# Patient Record
Sex: FEMALE | Race: WHITE | Employment: FULL TIME | URBAN - METROPOLITAN AREA
[De-identification: names, ages, dates, MRNs, and addresses within clinical notes are randomized per-mention and may not be internally consistent; named-entity substitution may affect disease eponyms.]

---

## 2017-03-13 ENCOUNTER — HOSPITAL ENCOUNTER (OUTPATIENT)
Dept: PHYSICAL THERAPY | Age: 22
Discharge: HOME | End: 2017-03-31
Attending: ORTHOPAEDIC SURGERY | Admitting: ORTHOPAEDIC SURGERY

## 2017-03-15 ENCOUNTER — HOSPITAL ENCOUNTER (OUTPATIENT)
Dept: PHYSICAL THERAPY | Age: 22
Discharge: HOME | End: 2017-03-15
Admitting: ORTHOPAEDIC SURGERY

## 2017-03-20 ENCOUNTER — HOSPITAL ENCOUNTER (OUTPATIENT)
Dept: PHYSICAL THERAPY | Age: 22
Discharge: HOME | End: 2017-03-20
Admitting: ORTHOPAEDIC SURGERY

## 2017-03-30 ENCOUNTER — HOSPITAL ENCOUNTER (OUTPATIENT)
Dept: PHYSICAL THERAPY | Age: 22
Discharge: HOME | End: 2017-03-30
Admitting: ORTHOPAEDIC SURGERY

## 2017-03-31 ENCOUNTER — HOSPITAL ENCOUNTER (OUTPATIENT)
Dept: PHYSICAL THERAPY | Age: 22
Discharge: HOME | End: 2017-03-31
Admitting: ORTHOPAEDIC SURGERY

## 2017-04-03 ENCOUNTER — HOSPITAL ENCOUNTER (OUTPATIENT)
Dept: PHYSICAL THERAPY | Age: 22
Discharge: HOME | End: 2017-04-03
Admitting: ORTHOPAEDIC SURGERY

## 2017-12-11 ENCOUNTER — OFFICE VISIT (OUTPATIENT)
Dept: INTERNAL MEDICINE CLINIC | Age: 22
End: 2017-12-11

## 2017-12-11 VITALS
RESPIRATION RATE: 14 BRPM | BODY MASS INDEX: 27.6 KG/M2 | SYSTOLIC BLOOD PRESSURE: 110 MMHG | HEART RATE: 80 BPM | DIASTOLIC BLOOD PRESSURE: 80 MMHG | WEIGHT: 150 LBS | HEIGHT: 62 IN

## 2017-12-11 DIAGNOSIS — G43.009 MIGRAINE WITHOUT AURA AND WITHOUT STATUS MIGRAINOSUS, NOT INTRACTABLE: Primary | ICD-10-CM

## 2017-12-11 DIAGNOSIS — G93.5 ARNOLD-CHIARI MALFORMATION, TYPE I (HCC): ICD-10-CM

## 2017-12-11 DIAGNOSIS — F41.9 ANXIETY: ICD-10-CM

## 2017-12-11 PROBLEM — Q07.01 ARNOLD-CHIARI MALFORMATION, TYPE II (HCC): Status: RESOLVED | Noted: 2017-12-11 | Resolved: 2017-12-11

## 2017-12-11 PROCEDURE — 99204 OFFICE O/P NEW MOD 45 MIN: CPT | Performed by: INTERNAL MEDICINE

## 2017-12-11 RX ORDER — SUMATRIPTAN 100 MG/1
TABLET, FILM COATED ORAL
COMMUNITY
Start: 2017-10-12 | End: 2019-09-16 | Stop reason: SDUPTHER

## 2017-12-11 RX ORDER — ESCITALOPRAM OXALATE 10 MG/1
TABLET ORAL
COMMUNITY
End: 2018-10-17 | Stop reason: SDUPTHER

## 2017-12-11 ASSESSMENT — ENCOUNTER SYMPTOMS
NAUSEA: 0
SHORTNESS OF BREATH: 0
RHINORRHEA: 0
WHEEZING: 0
VOMITING: 0
ABDOMINAL PAIN: 0

## 2017-12-11 ASSESSMENT — PATIENT HEALTH QUESTIONNAIRE - PHQ9
SUM OF ALL RESPONSES TO PHQ9 QUESTIONS 1 & 2: 0
2. FEELING DOWN, DEPRESSED OR HOPELESS: 0
1. LITTLE INTEREST OR PLEASURE IN DOING THINGS: 0
SUM OF ALL RESPONSES TO PHQ QUESTIONS 1-9: 0

## 2017-12-11 NOTE — PROGRESS NOTES
Subjective:      Patient ID: Doug Valverde is a 23 y.o. female. HPI    Patient is here to establish care. She has a history of migraines. The last several years. It started after she had a head injury. She is seen a neurologist at [facility].  Patient recently presented to the emergency room with severe headaches. She had episodes of vomiting with headache. At that time she was referred back to see her neurologist at [facility]. The neurologist at [facility] referred her to a neurosurgeon. MRI of the brain and cervical spine and thoracic spine and lumbar spine was done. She has a diagnosis of our Charna Rodes Chiari malformation type I made 4 years ago. This is unchanged on recent MRIs. Her HA are doing well. Patient has a history of anxiety. She takes Lexapro for this. Patient is also on birth control pills. She is sexually active. She has a single partner. Review of Systems   Constitutional: Negative for appetite change and fatigue. HENT: Negative for postnasal drip and rhinorrhea. Respiratory: Negative for shortness of breath and wheezing. Cardiovascular: Negative for chest pain and palpitations. Gastrointestinal: Negative for abdominal pain, nausea and vomiting. Musculoskeletal: Negative for joint swelling. Skin: Negative for rash. Neurological: Negative for light-headedness. Psychiatric/Behavioral: Negative for sleep disturbance. History reviewed. No pertinent surgical history.     Family History   Problem Relation Age of Onset    High Blood Pressure Mother     High Blood Pressure Father     Allergy (Severe) Brother     Heart Disease Maternal Grandmother     Heart Disease Maternal Grandfather     Heart Disease Paternal Grandmother     Heart Disease Paternal Grandfather     Diabetes Maternal Cousin        Social History   Substance Use Topics    Smoking status: Never Smoker    Smokeless tobacco: Never Used    Alcohol use No         Current Outpatient Prescriptions:     SUMAtriptan (IMITREX) 100 MG tablet, Take one tablet for a severe headache/migraine. May repeat one tablet in 2 hours if needed one time. Do not use more than 2 tablets in 24 hours. Do not use more than 8 tablets in one month., Disp: , Rfl:     FOLIC ACID PO, Take by mouth, Disp: , Rfl:     escitalopram (LEXAPRO) 10 MG tablet, 10 mg every evening., Disp: , Rfl:     topiramate (TOPAMAX) 25 MG tablet, Take 50 mg by mouth 2 times daily , Disp: , Rfl:     promethazine (PHENERGAN) 25 MG tablet, Take 25 mg by mouth every 6 hours as needed for Nausea, Disp: , Rfl:     levonorgestrel-ethinyl estradiol (ALTAVERA) 0.15-30 MG-MCG per tablet, Take 1 tablet by mouth daily, Disp: , Rfl:     clindamycin-benzoyl peroxide (BENZACLIN) 1-5 % gel, Apply topically 2 times daily Apply topically 2 times daily. , Disp: , Rfl:     ibuprofen (ADVIL;MOTRIN) 200 MG CAPS, Take 2 capsules by mouth.  , Disp: , Rfl:       /80 (Site: Right Arm, Position: Sitting, Cuff Size: Medium Adult)   Pulse 80   Resp 14   Ht 5' 2\" (1.575 m)   Wt 150 lb (68 kg)   BMI 27.44 kg/m²     Objective:   Physical Exam   Constitutional: She is oriented to person, place, and time. She appears well-developed and well-nourished. HENT:   Head: Normocephalic. Eyes: Conjunctivae and EOM are normal. Pupils are equal, round, and reactive to light. Neck: Trachea normal and normal range of motion. Neck supple. No JVD present. Carotid bruit is not present. No thyroid mass and no thyromegaly present. Cardiovascular: Normal rate, regular rhythm and normal heart sounds. Exam reveals no gallop. No murmur heard. Pulmonary/Chest: Effort normal and breath sounds normal. No respiratory distress. She has no wheezes. She has no rales. Abdominal: Soft. Bowel sounds are normal. She exhibits no distension and no mass. There is no splenomegaly or hepatomegaly. There is no tenderness. Musculoskeletal: Normal range of motion.  She exhibits no edema. Lymphadenopathy:     She has no cervical adenopathy. Neurological: She is alert and oriented to person, place, and time. She has normal strength and normal reflexes. No cranial nerve deficit. Skin: Skin is warm and dry. No rash noted. Psychiatric: She has a normal mood and affect. Her behavior is normal. Judgment and thought content normal.       Assessment:      1. Migraine without aura and without status migrainosus, not intractable     2. Arnold-Chiari malformation, type I ([facility]     3. Anxiety             Plan:      Migraines well controlled on Lexapro. Arnold Chiari Malformation type 1: stable. Anxiety on Lexapro.     [facility]

## 2018-01-19 ENCOUNTER — OFFICE VISIT (OUTPATIENT)
Dept: INTERNAL MEDICINE CLINIC | Age: 23
End: 2018-01-19

## 2018-01-19 VITALS
BODY MASS INDEX: 27.6 KG/M2 | SYSTOLIC BLOOD PRESSURE: 100 MMHG | HEART RATE: 80 BPM | RESPIRATION RATE: 14 BRPM | DIASTOLIC BLOOD PRESSURE: 70 MMHG | WEIGHT: 150 LBS | TEMPERATURE: 97.8 F | HEIGHT: 62 IN

## 2018-01-19 DIAGNOSIS — J01.90 ACUTE SINUSITIS, RECURRENCE NOT SPECIFIED, UNSPECIFIED LOCATION: Primary | ICD-10-CM

## 2018-01-19 PROCEDURE — 99212 OFFICE O/P EST SF 10 MIN: CPT | Performed by: NURSE PRACTITIONER

## 2018-01-19 RX ORDER — AZITHROMYCIN 250 MG/1
TABLET, FILM COATED ORAL
Qty: 1 PACKET | Refills: 0 | Status: SHIPPED | OUTPATIENT
Start: 2018-01-19 | End: 2018-01-25

## 2018-01-19 ASSESSMENT — ENCOUNTER SYMPTOMS
NAUSEA: 0
ABDOMINAL PAIN: 0
COUGH: 1
FLU SYMPTOMS: 1
VOMITING: 0
SORE THROAT: 1

## 2018-01-19 NOTE — PROGRESS NOTES
heard.  Pulmonary/Chest: Effort normal and breath sounds normal. No respiratory distress. She has no wheezes. She has no rales. Lymphadenopathy:     She has no cervical adenopathy. Neurological: She is alert and oriented to person, place, and time. Skin: Skin is warm and dry. No rash noted. Psychiatric: She has a normal mood and affect. Her behavior is normal. Judgment and thought content normal.         Assessment and Plan    Acute Sinusitis  - patient not having high fevers. She is afebrile in the office. Symptoms onset was 6 days ago. - Will Rx. Zithromax. Continue Dayquil/Nyquil, Mucinex, saline nasal spray. - rest, fluids. - notify provider if symptoms worsen or do not improve.      John OLIVEIRAP-C  1/19/2018

## 2018-01-25 ENCOUNTER — TELEPHONE (OUTPATIENT)
Dept: INTERNAL MEDICINE CLINIC | Age: 23
End: 2018-01-25

## 2018-01-25 ENCOUNTER — OFFICE VISIT (OUTPATIENT)
Dept: INTERNAL MEDICINE CLINIC | Age: 23
End: 2018-01-25

## 2018-01-25 VITALS
TEMPERATURE: 98.2 F | HEIGHT: 62 IN | HEART RATE: 80 BPM | DIASTOLIC BLOOD PRESSURE: 80 MMHG | SYSTOLIC BLOOD PRESSURE: 115 MMHG | WEIGHT: 150 LBS | BODY MASS INDEX: 27.6 KG/M2

## 2018-01-25 DIAGNOSIS — J20.9 ACUTE BRONCHITIS, UNSPECIFIED ORGANISM: Primary | ICD-10-CM

## 2018-01-25 PROCEDURE — 99213 OFFICE O/P EST LOW 20 MIN: CPT | Performed by: PHYSICIAN ASSISTANT

## 2018-01-25 ASSESSMENT — ENCOUNTER SYMPTOMS
COUGH: 1
SORE THROAT: 0
ABDOMINAL PAIN: 0
WHEEZING: 0
SHORTNESS OF BREATH: 0
VOMITING: 0
NAUSEA: 0
RHINORRHEA: 0

## 2018-01-25 NOTE — PATIENT INSTRUCTIONS
Try mucinex with decongestant during the day     You can take nyquil at night    I would not use any other over the counter meds as many of the same contain the same ingredients    Try warm water with honey   Patient Education        Bronchitis: Care Instructions  Your Care Instructions    Bronchitis is inflammation of the bronchial tubes, which carry air to the lungs. The tubes swell and produce mucus, or phlegm. The mucus and inflamed bronchial tubes make you cough. You may have trouble breathing. Most cases of bronchitis are caused by viruses like those that cause colds. Antibiotics usually do not help and they may be harmful. Bronchitis usually develops rapidly and lasts about 2 to 3 weeks in otherwise healthy people. Follow-up care is a key part of your treatment and safety. Be sure to make and go to all appointments, and call your doctor if you are having problems. It's also a good idea to know your test results and keep a list of the medicines you take. How can you care for yourself at home? · Take all medicines exactly as prescribed. Call your doctor if you think you are having a problem with your medicine. · Get some extra rest.  · Take an over-the-counter pain medicine, such as acetaminophen (Tylenol), ibuprofen (Advil, Motrin), or naproxen (Aleve) to reduce fever and relieve body aches. Read and follow all instructions on the label. · Do not take two or more pain medicines at the same time unless the doctor told you to. Many pain medicines have acetaminophen, which is Tylenol. Too much acetaminophen (Tylenol) can be harmful. · Take an over-the-counter cough medicine that contains dextromethorphan to help quiet a dry, hacking cough so that you can sleep. Avoid cough medicines that have more than one active ingredient. Read and follow all instructions on the label. · Breathe moist air from a humidifier, hot shower, or sink filled with hot water.  The heat and moisture will thin mucus so you can cough it out. · Do not smoke. Smoking can make bronchitis worse. If you need help quitting, talk to your doctor about stop-smoking programs and medicines. These can increase your chances of quitting for good. When should you call for help? Call 911 anytime you think you may need emergency care. For example, call if:  ? · You have severe trouble breathing. ?Call your doctor now or seek immediate medical care if:  ? · You have new or worse trouble breathing. ? · You cough up dark brown or bloody mucus (sputum). ? · You have a new or higher fever. ? · You have a new rash. ? Watch closely for changes in your health, and be sure to contact your doctor if:  ? · You cough more deeply or more often, especially if you notice more mucus or a change in the color of your mucus. ? · You are not getting better as expected. Where can you learn more? Go to [email or web address] and sign in to your [employer] account. Enter H333 in the pickrset box to learn more about \"Bronchitis: Care Instructions. \"     If you do not have an account, please click on the \"Sign Up Now\" link. Current as of: May 12, 2017  Content Version: 11.5  © 5220-9893 [employer]. Care instructions adapted under license by [facility] ([facility]). If you have questions about a medical condition or this instruction, always ask your healthcare professional. [place] [address] any warranty or liability for your use of this information. Patient Education        Cough: Care Instructions  Your Care Instructions    A cough is your body's response to something that bothers your throat or airways. Many things can cause a cough. You might cough because of a cold or the flu, bronchitis, or asthma. Smoking, postnasal drip, allergies, and stomach acid that backs up into your throat also can cause coughs. A cough is a symptom, not a disease. Most coughs stop when the cause, such as a cold, goes away.  You of: May 12, 2017  Content Version: 11.5  © 8756-2490 [employer]. Care instructions adapted under license by [facility] ([facility]). If you have questions about a medical condition or this instruction, always ask your healthcare professional. [address] any warranty or liability for your use of this information.

## 2018-01-25 NOTE — PROGRESS NOTES
Chief Complaint   Patient presents with    Cough        HPI  Werner Pat is a 23 y.o. female who presents for evaluation of  Cough. Was seen last week for URI. She had myalgias, sinus congestion, headaches. She was treated with a zpak for sinusitis. She felt better, then 2 days ago developed a cough. No fever and no chills. Cough is not productive. Still some mild nasal congestion. Mild sore throat. Mom and dad are sick with similar symptoms     She is never smoker, does not live with smokers, no history of asthma    Review of Systems   Constitutional: Negative for appetite change, chills and fever. HENT: Positive for congestion. Negative for postnasal drip, rhinorrhea and sore throat. Respiratory: Positive for cough. Negative for shortness of breath and wheezing. Cardiovascular: Negative for chest pain. Gastrointestinal: Negative for abdominal pain, nausea and vomiting. Musculoskeletal: Negative for neck pain and neck stiffness. Neurological: Positive for headaches. Allergies  Percocet [oxycodone-acetaminophen]; Wellbutrin [bupropion]; and Amoxicillin-pot clavulanate      Vitals  /80   Pulse 80   Temp 98.2 °F (36.8 °C)   Ht 5' 2\" (1.575 m)   Wt 150 lb (68 kg)   BMI 27.44 kg/m²     Current Medications  Current Outpatient Prescriptions   Medication Sig Dispense Refill    SUMAtriptan (IMITREX) 100 MG tablet Take one tablet for a severe headache/migraine. May repeat one tablet in 2 hours if needed one time. Do not use more than 2 tablets in 24 hours. Do not use more than 8 tablets in one month.  escitalopram (LEXAPRO) 10 MG tablet 10 mg every evening.       FOLIC ACID PO Take by mouth      topiramate (TOPAMAX) 25 MG tablet Take 50 mg by mouth 2 times daily       levonorgestrel-ethinyl estradiol (ALTAVERA) 0.15-30 MG-MCG per tablet Take 1 tablet by mouth daily      clindamycin-benzoyl peroxide (BENZACLIN) 1-5 % gel Apply topically 2 times daily Apply topically 2 times daily.  ibuprofen (ADVIL;MOTRIN) 200 MG CAPS Take 2 capsules by mouth. No current facility-administered medications for this visit. Past Medical History  Past Medical History:   Diagnosis Date    Anxiety     Arnold-Chiari malformation, type I ([facility] 12/11/2017    Arnold-Chiari malformation, type II ([facility] 12/11/2017    Chiari I malformation ([facility]     Concussion 11/2013    Depression     Migraine without aura and without status migrainosus, not intractable 12/11/2017       Social History  Social History     Social History    Marital status: Single     Spouse name: N/A    Number of children: N/A    Years of education: N/A     Occupational History    Not on file. Social History Main Topics    Smoking status: Never Smoker    Smokeless tobacco: Never Used    Alcohol use No    Drug use: No    Sexual activity: Yes     Partners: Male     Other Topics Concern    Not on file     Social History Narrative    No narrative on file       Surgical History  No past surgical history on file. Physical Exam   Constitutional: She is oriented to person, place, and time. She appears well-developed and well-nourished. She does not have a sickly appearance. She does not appear ill. HENT:   Head: Normocephalic and atraumatic. Right Ear: Tympanic membrane normal.   Left Ear: Tympanic membrane normal.   Nose: Right sinus exhibits no maxillary sinus tenderness and no frontal sinus tenderness. Left sinus exhibits no maxillary sinus tenderness and no frontal sinus tenderness. Mouth/Throat: Uvula is midline, oropharynx is clear and moist and mucous membranes are normal.   Eyes: Conjunctivae and EOM are normal. Pupils are equal, round, and reactive to light. Right eye exhibits no discharge. Left eye exhibits no discharge. Neck: Trachea normal and normal range of motion. Neck supple. Cardiovascular: Normal rate and regular rhythm.     Pulmonary/Chest: Effort normal and breath sounds normal.

## 2018-01-29 RX ORDER — AZITHROMYCIN 250 MG/1
TABLET, FILM COATED ORAL
Qty: 1 PACKET | Refills: 0 | Status: SHIPPED | OUTPATIENT
Start: 2018-01-29 | End: 2018-11-14 | Stop reason: ALTCHOICE

## 2018-01-29 NOTE — TELEPHONE ENCOUNTER
----- Message from Evy Blanca MD sent at 1/29/2018  3:18 PM EST -----  Contact: [phone]  Alisa Ortiz    ----- Message -----  From: Nneka Winslow  Sent: 1/29/2018   1:35 PM  To: Evy Blanca MD    Called pt-said she really cant come in and afford another co-pay -said she was here twice last wk-she is still requesting something be called in -lr  ----- Message -----  From: Evy Blanca MD  Sent: 1/29/2018   1:31 PM  To:  Zoe Cancer SHY Winslow    I can see her today if she can come in, give her a mask  ----- Message -----  From: Nneka Winslow  Sent: 1/29/2018   8:18 AM  To: Evy Blanca MD    She seen derrick last thurs for bronchitis -was not prescribed any medication-said she got worse over the weekend-starting running a fever of 101 for 2 days-still coughing a lot and generally not feeling any better-said she has been trying mucinex but not helping- could something be called in for her [facility] [place] [facility] [phone]

## 2018-01-31 ENCOUNTER — TELEPHONE (OUTPATIENT)
Dept: INTERNAL MEDICINE CLINIC | Age: 23
End: 2018-01-31

## 2018-01-31 RX ORDER — DOXYCYCLINE HYCLATE 100 MG
100 TABLET ORAL 2 TIMES DAILY
Qty: 14 TABLET | Refills: 0 | Status: SHIPPED | OUTPATIENT
Start: 2018-01-31 | End: 2018-11-14 | Stop reason: ALTCHOICE

## 2018-02-13 ENCOUNTER — TELEPHONE (OUTPATIENT)
Dept: INTERNAL MEDICINE CLINIC | Age: 23
End: 2018-02-13

## 2018-02-13 NOTE — TELEPHONE ENCOUNTER
----- Message from Martín Bautista MD sent at 2/13/2018 11:16 AM EST -----  Contact: pt [phone]  Check dose and ok  ----- Message -----  From: Matias Winslow  Sent: 2/13/2018   9:58 AM  To: Martín Bautista MD    Pt needs refill on Folic Acid, which was previously prescribed by neurologist. She no longer sees the neurologist.   Maya Canales [phone]  Last 1-25  Next 3-13  MT

## 2018-02-14 RX ORDER — FOLIC ACID 1 MG/1
1 TABLET ORAL DAILY
Qty: 30 TABLET | Refills: 0 | Status: SHIPPED | OUTPATIENT
Start: 2018-02-14 | End: 2018-03-14 | Stop reason: SDUPTHER

## 2018-02-14 NOTE — TELEPHONE ENCOUNTER
----- Message from Kylah Gardner MD sent at 2/13/2018 11:16 AM EST -----  Contact: pt [phone]  Check dose and ok  ----- Message -----  From: Fer Winslow  Sent: 2/13/2018   9:58 AM  To: Kylah Gardner MD    Pt needs refill on Folic Acid, which was previously prescribed by neurologist. She no longer sees the neurologist.   Tracy Arrow [phone]  Last 1-25  Next 3-13  MT

## 2018-03-14 RX ORDER — FOLIC ACID 1 MG/1
1 TABLET ORAL DAILY
Qty: 30 TABLET | Refills: 0 | Status: SHIPPED | OUTPATIENT
Start: 2018-03-14 | End: 2018-04-11 | Stop reason: SDUPTHER

## 2018-03-14 RX ORDER — FOLIC ACID 1 MG/1
1 TABLET ORAL DAILY
Qty: 30 TABLET | Refills: 0 | OUTPATIENT
Start: 2018-03-14

## 2018-04-03 ENCOUNTER — OFFICE VISIT (OUTPATIENT)
Dept: INTERNAL MEDICINE CLINIC | Age: 23
End: 2018-04-03

## 2018-04-03 ENCOUNTER — HOSPITAL ENCOUNTER (OUTPATIENT)
Dept: CT IMAGING | Age: 23
Discharge: HOME | End: 2018-04-03
Attending: INTERNAL MEDICINE | Admitting: INTERNAL MEDICINE

## 2018-04-03 ENCOUNTER — TELEPHONE (OUTPATIENT)
Dept: INTERNAL MEDICINE CLINIC | Age: 23
End: 2018-04-03

## 2018-04-03 VITALS
HEIGHT: 61 IN | RESPIRATION RATE: 12 BRPM | TEMPERATURE: 98.4 F | BODY MASS INDEX: 28.89 KG/M2 | DIASTOLIC BLOOD PRESSURE: 78 MMHG | SYSTOLIC BLOOD PRESSURE: 110 MMHG | WEIGHT: 153 LBS

## 2018-04-03 DIAGNOSIS — R10.9 ABDOMINAL PAIN: ICD-10-CM

## 2018-04-03 DIAGNOSIS — R10.9 ABDOMINAL PAIN IN FEMALE PATIENT: Primary | ICD-10-CM

## 2018-04-03 DIAGNOSIS — R10.9 ABDOMINAL PAIN IN FEMALE PATIENT: ICD-10-CM

## 2018-04-03 LAB
BILIRUBIN, POC: NORMAL
BLOOD URINE, POC: NORMAL
CLARITY, POC: NORMAL
COLOR, POC: NORMAL
GLUCOSE URINE, POC: NORMAL
KETONES, POC: NORMAL
LEUKOCYTE EST, POC: NORMAL
NITRITE, POC: NORMAL
PH, POC: NORMAL
PROTEIN, POC: NORMAL
SPECIFIC GRAVITY, POC: NORMAL
UROBILINOGEN, POC: NORMAL

## 2018-04-03 PROCEDURE — 99213 OFFICE O/P EST LOW 20 MIN: CPT | Performed by: PHYSICIAN ASSISTANT

## 2018-04-03 PROCEDURE — 81002 URINALYSIS NONAUTO W/O SCOPE: CPT | Performed by: PHYSICIAN ASSISTANT

## 2018-04-03 ASSESSMENT — ENCOUNTER SYMPTOMS
ABDOMINAL DISTENTION: 0
CHEST TIGHTNESS: 0
EYE DISCHARGE: 0
SHORTNESS OF BREATH: 0
VOMITING: 1
CONSTIPATION: 1
SORE THROAT: 0
ABDOMINAL PAIN: 1
DIARRHEA: 1
WHEEZING: 0
NAUSEA: 1
COUGH: 0
SINUS PRESSURE: 0
BLOOD IN STOOL: 0
RHINORRHEA: 0
EYE ITCHING: 0
BACK PAIN: 0
EYE PAIN: 0

## 2018-04-11 RX ORDER — FOLIC ACID 1 MG/1
1 TABLET ORAL DAILY
Qty: 90 TABLET | Refills: 0 | Status: SHIPPED | OUTPATIENT
Start: 2018-04-11 | End: 2018-07-18 | Stop reason: SDUPTHER

## 2018-07-18 RX ORDER — FOLIC ACID 1 MG/1
1 TABLET ORAL DAILY
Qty: 90 TABLET | Refills: 0 | Status: SHIPPED | OUTPATIENT
Start: 2018-07-18 | End: 2018-10-17 | Stop reason: SDUPTHER

## 2018-10-17 RX ORDER — FOLIC ACID 1 MG/1
1 TABLET ORAL DAILY
Qty: 90 TABLET | Refills: 0 | Status: SHIPPED | OUTPATIENT
Start: 2018-10-17 | End: 2019-01-09 | Stop reason: SDUPTHER

## 2018-10-17 RX ORDER — ESCITALOPRAM OXALATE 10 MG/1
TABLET ORAL
Qty: 90 TABLET | Refills: 0 | Status: SHIPPED | OUTPATIENT
Start: 2018-10-17 | End: 2019-01-03 | Stop reason: SDUPTHER

## 2018-10-17 RX ORDER — TOPIRAMATE 25 MG/1
50 TABLET ORAL 2 TIMES DAILY
Qty: 60 TABLET | Refills: 2 | Status: SHIPPED | OUTPATIENT
Start: 2018-10-17 | End: 2019-01-03 | Stop reason: SDUPTHER

## 2018-11-14 ENCOUNTER — HOSPITAL ENCOUNTER (EMERGENCY)
Age: 23
Discharge: HOME | End: 2018-11-14
Attending: EMERGENCY MEDICINE
Payer: COMMERCIAL

## 2018-11-14 VITALS
TEMPERATURE: 98.2 F | HEART RATE: 68 BPM | SYSTOLIC BLOOD PRESSURE: 114 MMHG | BODY MASS INDEX: 28.34 KG/M2 | WEIGHT: 150 LBS | OXYGEN SATURATION: 99 % | DIASTOLIC BLOOD PRESSURE: 70 MMHG | RESPIRATION RATE: 16 BRPM

## 2018-11-14 DIAGNOSIS — R51.9 ACUTE NONINTRACTABLE HEADACHE, UNSPECIFIED HEADACHE TYPE: Primary | ICD-10-CM

## 2018-11-14 PROCEDURE — 6360000002 HC RX W HCPCS: Performed by: EMERGENCY MEDICINE

## 2018-11-14 PROCEDURE — 2580000003 HC RX 258: Performed by: EMERGENCY MEDICINE

## 2018-11-14 PROCEDURE — 99283 EMERGENCY DEPT VISIT LOW MDM: CPT

## 2018-11-14 PROCEDURE — 96375 TX/PRO/DX INJ NEW DRUG ADDON: CPT

## 2018-11-14 PROCEDURE — 96361 HYDRATE IV INFUSION ADD-ON: CPT

## 2018-11-14 PROCEDURE — 96374 THER/PROPH/DIAG INJ IV PUSH: CPT

## 2018-11-14 RX ORDER — LEVONORGESTREL AND ETHINYL ESTRADIOL 0.15-0.03
1 KIT ORAL DAILY
COMMUNITY
End: 2022-03-15 | Stop reason: ALTCHOICE

## 2018-11-14 RX ORDER — 0.9 % SODIUM CHLORIDE 0.9 %
1000 INTRAVENOUS SOLUTION INTRAVENOUS ONCE
Status: COMPLETED | OUTPATIENT
Start: 2018-11-14 | End: 2018-11-14

## 2018-11-14 RX ORDER — METHYLPREDNISOLONE SODIUM SUCCINATE 125 MG/2ML
125 INJECTION, POWDER, LYOPHILIZED, FOR SOLUTION INTRAMUSCULAR; INTRAVENOUS ONCE
Status: COMPLETED | OUTPATIENT
Start: 2018-11-14 | End: 2018-11-14

## 2018-11-14 RX ADMIN — PROCHLORPERAZINE EDISYLATE 10 MG: 5 INJECTION INTRAMUSCULAR; INTRAVENOUS at 08:18

## 2018-11-14 RX ADMIN — METHYLPREDNISOLONE SODIUM SUCCINATE 125 MG: 125 INJECTION, POWDER, FOR SOLUTION INTRAMUSCULAR; INTRAVENOUS at 09:03

## 2018-11-14 RX ADMIN — SODIUM CHLORIDE 1000 ML: 9 INJECTION, SOLUTION INTRAVENOUS at 08:18

## 2018-11-14 ASSESSMENT — PAIN SCALES - GENERAL: PAINLEVEL_OUTOF10: 9

## 2018-11-14 NOTE — ED PROVIDER NOTES
Maternal Grandfather     Heart Disease Paternal Grandmother     Heart Disease Paternal Grandfather     Diabetes Maternal Cousin      Social History     Social History    Marital status: Single     Spouse name: N/A    Number of children: N/A    Years of education: N/A     Occupational History    Not on file. Social History Main Topics    Smoking status: Never Smoker    Smokeless tobacco: Never Used    Alcohol use No    Drug use: No    Sexual activity: Yes     Partners: Male     Other Topics Concern    Not on file     Social History Narrative    No narrative on file     Current Facility-Administered Medications   Medication Dose Route Frequency Provider Last Rate Last Dose    0.9 % sodium chloride bolus  1,000 mL Intravenous Once Shobha Driver MD 1,000 mL/hr at 11/14/18 0818 1,000 mL at 11/14/18 0818    methylPREDNISolone sodium (SOLU-MEDROL) injection 125 mg  125 mg Intravenous Once Shobha Driver MD         Current Outpatient Prescriptions   Medication Sig Dispense Refill    levonorgestrel-ethinyl estradiol (INTROVALE) 0.15-0.03 MG per tablet Take 1 tablet by mouth daily      topiramate (TOPAMAX) 25 MG tablet Take 2 tablets by mouth 2 times daily 60 tablet 2    folic acid (FOLVITE) 1 MG tablet Take 1 tablet by mouth daily 90 tablet 0    escitalopram (LEXAPRO) 10 MG tablet 10 mg every evening. 90 tablet 0    SUMAtriptan (IMITREX) 100 MG tablet Take one tablet for a severe headache/migraine. May repeat one tablet in 2 hours if needed one time. Do not use more than 2 tablets in 24 hours. Do not use more than 8 tablets in one month.  ibuprofen (ADVIL;MOTRIN) 200 MG CAPS Take 2 capsules by mouth.          Allergies   Allergen Reactions    Percocet [Oxycodone-Acetaminophen] Itching    Wellbutrin [Bupropion] Other (See Comments)     Suspects caused headache    Amoxicillin-Pot Clavulanate Nausea And Vomiting       Nursing Notes Reviewed    Physical Exam:  ED Triage Vitals [11/14/18 0806]

## 2019-01-03 RX ORDER — ESCITALOPRAM OXALATE 10 MG/1
TABLET ORAL
Qty: 90 TABLET | Refills: 0 | Status: SHIPPED | OUTPATIENT
Start: 2019-01-03 | End: 2019-03-05 | Stop reason: SDUPTHER

## 2019-01-09 RX ORDER — FOLIC ACID 1 MG/1
1 TABLET ORAL DAILY
Qty: 90 TABLET | Refills: 0 | Status: SHIPPED | OUTPATIENT
Start: 2019-01-09 | End: 2019-04-01 | Stop reason: SDUPTHER

## 2019-03-05 ENCOUNTER — OFFICE VISIT (OUTPATIENT)
Dept: INTERNAL MEDICINE CLINIC | Age: 24
End: 2019-03-05

## 2019-03-05 VITALS
HEART RATE: 70 BPM | HEIGHT: 62 IN | RESPIRATION RATE: 14 BRPM | WEIGHT: 158 LBS | BODY MASS INDEX: 29.08 KG/M2 | DIASTOLIC BLOOD PRESSURE: 80 MMHG | SYSTOLIC BLOOD PRESSURE: 130 MMHG

## 2019-03-05 DIAGNOSIS — Z00.00 ROUTINE GENERAL MEDICAL EXAMINATION AT A HEALTH CARE FACILITY: Primary | ICD-10-CM

## 2019-03-05 DIAGNOSIS — G93.5 ARNOLD-CHIARI MALFORMATION, TYPE I (HCC): ICD-10-CM

## 2019-03-05 DIAGNOSIS — G43.009 MIGRAINE WITHOUT AURA AND WITHOUT STATUS MIGRAINOSUS, NOT INTRACTABLE: ICD-10-CM

## 2019-03-05 DIAGNOSIS — F41.9 ANXIETY: ICD-10-CM

## 2019-03-05 PROCEDURE — 99395 PREV VISIT EST AGE 18-39: CPT | Performed by: INTERNAL MEDICINE

## 2019-03-05 RX ORDER — DULOXETIN HYDROCHLORIDE 30 MG/1
CAPSULE, DELAYED RELEASE ORAL
Qty: 90 CAPSULE | Refills: 1 | Status: SHIPPED | OUTPATIENT
Start: 2019-03-05 | End: 2019-03-06 | Stop reason: SDUPTHER

## 2019-03-05 RX ORDER — ESCITALOPRAM OXALATE 10 MG/1
TABLET ORAL
Qty: 90 TABLET | Refills: 0 | Status: SHIPPED | OUTPATIENT
Start: 2019-03-05 | End: 2019-09-16

## 2019-03-05 ASSESSMENT — ENCOUNTER SYMPTOMS
WHEEZING: 0
VOMITING: 0
SHORTNESS OF BREATH: 0
NAUSEA: 0
RHINORRHEA: 0
ABDOMINAL PAIN: 0

## 2019-03-06 DIAGNOSIS — Z00.00 ROUTINE GENERAL MEDICAL EXAMINATION AT A HEALTH CARE FACILITY: ICD-10-CM

## 2019-03-06 RX ORDER — DULOXETIN HYDROCHLORIDE 30 MG/1
CAPSULE, DELAYED RELEASE ORAL
Qty: 30 CAPSULE | Refills: 0 | Status: SHIPPED | OUTPATIENT
Start: 2019-03-06 | End: 2019-03-29 | Stop reason: SDUPTHER

## 2019-03-19 RX ORDER — TOPIRAMATE 25 MG/1
TABLET ORAL
Qty: 120 TABLET | Refills: 0 | Status: SHIPPED | OUTPATIENT
Start: 2019-03-19 | End: 2019-04-25 | Stop reason: SDUPTHER

## 2019-03-25 ENCOUNTER — TELEPHONE (OUTPATIENT)
Dept: INTERNAL MEDICINE CLINIC | Age: 24
End: 2019-03-25

## 2019-03-25 RX ORDER — AZITHROMYCIN 250 MG/1
TABLET, FILM COATED ORAL
Qty: 1 PACKET | Refills: 0 | Status: SHIPPED | OUTPATIENT
Start: 2019-03-25 | End: 2019-09-16

## 2019-03-29 DIAGNOSIS — Z00.00 ROUTINE GENERAL MEDICAL EXAMINATION AT A HEALTH CARE FACILITY: ICD-10-CM

## 2019-03-29 RX ORDER — DULOXETIN HYDROCHLORIDE 30 MG/1
CAPSULE, DELAYED RELEASE ORAL
Qty: 30 CAPSULE | Refills: 0 | Status: SHIPPED | OUTPATIENT
Start: 2019-03-29 | End: 2019-04-28 | Stop reason: SDUPTHER

## 2019-04-02 RX ORDER — FOLIC ACID 1 MG/1
TABLET ORAL
Qty: 90 TABLET | Refills: 0 | Status: SHIPPED | OUTPATIENT
Start: 2019-04-02 | End: 2019-08-01 | Stop reason: SDUPTHER

## 2019-04-25 RX ORDER — TOPIRAMATE 25 MG/1
TABLET ORAL
Qty: 120 TABLET | Refills: 1 | Status: SHIPPED | OUTPATIENT
Start: 2019-04-25 | End: 2019-05-22 | Stop reason: SDUPTHER

## 2019-04-25 NOTE — TELEPHONE ENCOUNTER
----- Message from Ankit Winslow sent at 4/25/2019 10:39 AM EDT -----  Contact: pt- [phone]  She needs a [facility]ill on [facility]ax [facility] [place] [facility] [phone]-last ruue-5-1-19-next appt- 6-18-19-lr

## 2019-04-28 DIAGNOSIS — Z00.00 ROUTINE GENERAL MEDICAL EXAMINATION AT A HEALTH CARE FACILITY: ICD-10-CM

## 2019-04-29 RX ORDER — DULOXETIN HYDROCHLORIDE 30 MG/1
CAPSULE, DELAYED RELEASE ORAL
Qty: 30 CAPSULE | Refills: 0 | Status: SHIPPED | OUTPATIENT
Start: 2019-04-29 | End: 2019-05-29 | Stop reason: SDUPTHER

## 2019-05-22 RX ORDER — TOPIRAMATE 25 MG/1
TABLET ORAL
Qty: 120 TABLET | Refills: 0 | Status: SHIPPED | OUTPATIENT
Start: 2019-05-22 | End: 2019-06-05 | Stop reason: SDUPTHER

## 2019-05-29 DIAGNOSIS — Z00.00 ROUTINE GENERAL MEDICAL EXAMINATION AT A HEALTH CARE FACILITY: ICD-10-CM

## 2019-05-29 RX ORDER — DULOXETIN HYDROCHLORIDE 30 MG/1
CAPSULE, DELAYED RELEASE ORAL
Qty: 30 CAPSULE | Refills: 0 | Status: SHIPPED | OUTPATIENT
Start: 2019-05-29 | End: 2019-06-07 | Stop reason: SDUPTHER

## 2019-06-05 RX ORDER — TOPIRAMATE 25 MG/1
TABLET ORAL
Qty: 360 TABLET | Refills: 0 | Status: SHIPPED | OUTPATIENT
Start: 2019-06-05 | End: 2019-09-20 | Stop reason: SDUPTHER

## 2019-06-07 DIAGNOSIS — Z00.00 ROUTINE GENERAL MEDICAL EXAMINATION AT A HEALTH CARE FACILITY: ICD-10-CM

## 2019-06-07 RX ORDER — DULOXETIN HYDROCHLORIDE 30 MG/1
CAPSULE, DELAYED RELEASE ORAL
Qty: 30 CAPSULE | Refills: 2 | Status: SHIPPED | OUTPATIENT
Start: 2019-06-07 | End: 2019-08-31 | Stop reason: SDUPTHER

## 2019-08-01 RX ORDER — FOLIC ACID 1 MG/1
TABLET ORAL
Qty: 90 TABLET | Refills: 0 | Status: SHIPPED | OUTPATIENT
Start: 2019-08-01 | End: 2019-10-25 | Stop reason: SDUPTHER

## 2019-08-31 DIAGNOSIS — Z00.00 ROUTINE GENERAL MEDICAL EXAMINATION AT A HEALTH CARE FACILITY: ICD-10-CM

## 2019-09-03 RX ORDER — DULOXETIN HYDROCHLORIDE 30 MG/1
CAPSULE, DELAYED RELEASE ORAL
Qty: 30 CAPSULE | Refills: 0 | Status: SHIPPED | OUTPATIENT
Start: 2019-09-03 | End: 2019-09-16 | Stop reason: SDUPTHER

## 2019-09-16 ENCOUNTER — OFFICE VISIT (OUTPATIENT)
Dept: INTERNAL MEDICINE CLINIC | Age: 24
End: 2019-09-16

## 2019-09-16 VITALS
BODY MASS INDEX: 28.52 KG/M2 | DIASTOLIC BLOOD PRESSURE: 80 MMHG | SYSTOLIC BLOOD PRESSURE: 110 MMHG | WEIGHT: 155 LBS | HEIGHT: 62 IN | RESPIRATION RATE: 14 BRPM | HEART RATE: 70 BPM

## 2019-09-16 DIAGNOSIS — G93.5 ARNOLD-CHIARI MALFORMATION, TYPE I (HCC): ICD-10-CM

## 2019-09-16 DIAGNOSIS — F41.9 ANXIETY: ICD-10-CM

## 2019-09-16 DIAGNOSIS — G43.009 MIGRAINE WITHOUT AURA AND WITHOUT STATUS MIGRAINOSUS, NOT INTRACTABLE: Primary | ICD-10-CM

## 2019-09-16 PROCEDURE — 86580 TB INTRADERMAL TEST: CPT | Performed by: INTERNAL MEDICINE

## 2019-09-16 PROCEDURE — 99213 OFFICE O/P EST LOW 20 MIN: CPT | Performed by: INTERNAL MEDICINE

## 2019-09-16 RX ORDER — DULOXETIN HYDROCHLORIDE 30 MG/1
30 CAPSULE, DELAYED RELEASE ORAL DAILY
Qty: 30 CAPSULE | Refills: 2 | Status: SHIPPED | OUTPATIENT
Start: 2019-09-16 | End: 2019-10-31 | Stop reason: SDUPTHER

## 2019-09-16 RX ORDER — SUMATRIPTAN 100 MG/1
TABLET, FILM COATED ORAL
Qty: 9 TABLET | Refills: 2 | Status: SHIPPED | OUTPATIENT
Start: 2019-09-16 | End: 2022-03-15 | Stop reason: SDUPTHER

## 2019-09-16 NOTE — PROGRESS NOTES
Effort normal and breath sounds normal. No respiratory distress. She has no wheezes. She has no rales. Abdominal: Soft. Bowel sounds are normal. She exhibits no distension and no mass. There is no splenomegaly or hepatomegaly. There is no tenderness. Musculoskeletal: Normal range of motion. She exhibits no edema. Lymphadenopathy:     She has no cervical adenopathy. Neurological: She is alert and oriented to person, place, and time. She has normal strength and normal reflexes. No cranial nerve deficit. Skin: Skin is warm and dry. No rash noted. Psychiatric: She has a normal mood and affect. Her behavior is normal. Judgment and thought content normal.       Assessment:       Diagnosis Orders   1. Migraine without aura and without status migrainosus, not intractable     2. Arnold-Chiari malformation, type I ([facility]     3. Anxiety            Plan:      #  Migraines controlled. #  Anxiety on Cymbalta. #  Arnold Chiari malformation stable. PPD placed left forearm.         Jaqui Villagomez MD

## 2019-09-20 RX ORDER — TOPIRAMATE 25 MG/1
TABLET ORAL
Qty: 360 TABLET | Refills: 0 | Status: SHIPPED | OUTPATIENT
Start: 2019-09-20 | End: 2020-01-06 | Stop reason: SDUPTHER

## 2019-09-23 ENCOUNTER — NURSE ONLY (OUTPATIENT)
Dept: INTERNAL MEDICINE CLINIC | Age: 24
End: 2019-09-23

## 2019-09-23 DIAGNOSIS — Z23 NEED FOR INFLUENZA VACCINATION: Primary | ICD-10-CM

## 2019-09-23 DIAGNOSIS — Z11.1 VISIT FOR TB SKIN TEST: ICD-10-CM

## 2019-09-23 PROCEDURE — 90682 RIV4 VACC RECOMBINANT DNA IM: CPT | Performed by: INTERNAL MEDICINE

## 2019-09-23 PROCEDURE — 90471 IMMUNIZATION ADMIN: CPT | Performed by: INTERNAL MEDICINE

## 2019-09-23 PROCEDURE — 86580 TB INTRADERMAL TEST: CPT | Performed by: INTERNAL MEDICINE

## 2019-09-25 ENCOUNTER — NURSE ONLY (OUTPATIENT)
Dept: INTERNAL MEDICINE CLINIC | Age: 24
End: 2019-09-25

## 2019-10-25 RX ORDER — FOLIC ACID 1 MG/1
TABLET ORAL
Qty: 90 TABLET | Refills: 0 | Status: SHIPPED | OUTPATIENT
Start: 2019-10-25 | End: 2020-01-29

## 2019-11-01 RX ORDER — DULOXETIN HYDROCHLORIDE 30 MG/1
CAPSULE, DELAYED RELEASE ORAL
Qty: 30 CAPSULE | Refills: 0 | Status: SHIPPED | OUTPATIENT
Start: 2019-11-01 | End: 2019-12-04 | Stop reason: SDUPTHER

## 2019-12-04 RX ORDER — DULOXETIN HYDROCHLORIDE 30 MG/1
CAPSULE, DELAYED RELEASE ORAL
Qty: 30 CAPSULE | Refills: 0 | Status: SHIPPED | OUTPATIENT
Start: 2019-12-04 | End: 2020-01-06 | Stop reason: SDUPTHER

## 2019-12-26 ENCOUNTER — TELEPHONE (OUTPATIENT)
Dept: INTERNAL MEDICINE CLINIC | Age: 24
End: 2019-12-26

## 2019-12-26 RX ORDER — AZITHROMYCIN 250 MG/1
250 TABLET, FILM COATED ORAL SEE ADMIN INSTRUCTIONS
Qty: 6 TABLET | Refills: 0 | Status: SHIPPED | OUTPATIENT
Start: 2019-12-26 | End: 2019-12-31

## 2020-01-06 ENCOUNTER — TELEPHONE (OUTPATIENT)
Dept: INTERNAL MEDICINE CLINIC | Age: 25
End: 2020-01-06

## 2020-01-06 RX ORDER — DULOXETIN HYDROCHLORIDE 30 MG/1
CAPSULE, DELAYED RELEASE ORAL
Qty: 30 CAPSULE | Refills: 1 | Status: SHIPPED | OUTPATIENT
Start: 2020-01-06 | End: 2020-01-29

## 2020-01-06 NOTE — TELEPHONE ENCOUNTER
----- Message from Jonn Bowen sent at 1/6/2020 11:22 AM EST -----  Contact: [phone]  Pt called because she needs a refill on DULoxetine (CYMBALTA) 30 MG     [phone]    Past appt-09/16/2019  Future appt-03/03/2020    Pharmacy[facility] [place]

## 2020-01-07 RX ORDER — TOPIRAMATE 25 MG/1
TABLET ORAL
Qty: 360 TABLET | Refills: 0 | Status: SHIPPED | OUTPATIENT
Start: 2020-01-07 | End: 2020-03-23

## 2020-01-26 ENCOUNTER — APPOINTMENT (OUTPATIENT)
Dept: GENERAL RADIOLOGY | Age: 25
End: 2020-01-26
Payer: COMMERCIAL

## 2020-01-26 ENCOUNTER — HOSPITAL ENCOUNTER (EMERGENCY)
Age: 25
Discharge: HOME | End: 2020-01-26
Attending: EMERGENCY MEDICINE
Payer: COMMERCIAL

## 2020-01-26 VITALS
DIASTOLIC BLOOD PRESSURE: 99 MMHG | OXYGEN SATURATION: 100 % | WEIGHT: 160 LBS | BODY MASS INDEX: 29.44 KG/M2 | SYSTOLIC BLOOD PRESSURE: 141 MMHG | HEIGHT: 62 IN | HEART RATE: 81 BPM | RESPIRATION RATE: 12 BRPM | TEMPERATURE: 99 F

## 2020-01-26 PROCEDURE — 73610 X-RAY EXAM OF ANKLE: CPT

## 2020-01-26 PROCEDURE — 73630 X-RAY EXAM OF FOOT: CPT

## 2020-01-26 PROCEDURE — 99283 EMERGENCY DEPT VISIT LOW MDM: CPT

## 2020-01-26 RX ORDER — IBUPROFEN 600 MG/1
600 TABLET ORAL EVERY 6 HOURS PRN
Qty: 28 TABLET | Refills: 0 | Status: SHIPPED | OUTPATIENT
Start: 2020-01-26 | End: 2020-04-30 | Stop reason: ALTCHOICE

## 2020-01-26 ASSESSMENT — PAIN SCALES - GENERAL: PAINLEVEL_OUTOF10: 8

## 2020-01-26 ASSESSMENT — PAIN DESCRIPTION - LOCATION: LOCATION: ANKLE

## 2020-01-26 ASSESSMENT — PAIN DESCRIPTION - ORIENTATION: ORIENTATION: RIGHT

## 2020-01-27 NOTE — ED PROVIDER NOTES
Emergency Department Attending Note    Victor Manuel Huggins MD    Date of ED VIsit: 1/26/2020    CHIEF COMPLAINT  Ankle Pain (Pt reports falling in hole and afterwards ankle gave out and fell down about two steps and c/o pain, unable to bear weight on it since. )      [address] is a 27 y.o. female  With Vital signs of BP (!) 141/99   Pulse 81   Temp 99 °F (37.2 °C) (Oral)   Resp 12   Ht 5' 2\" (1.575 m)   Wt 160 lb (72.6 kg)   SpO2 100%   BMI 29.26 kg/m²  who presents to the ED with a complaint of right foot and ankle pain. Patient seen and evaluated in room 4. Patient states she had 2 incidences today once she stepped in a hole and that she got when she got home she's fell down a couple stairs causing injury to her right ankle. Sounds like 1 of those injuries was an inversion type injury. She does not have any other complaints including any any other areas of trauma she never hit her head there is no loss of consciousness. She is only focused primarily on her ankle as the source of pain. .  No other complaints, modifying factors or associated symptoms. Patients Past medical history reviewed and listed below  Past Medical History:   Diagnosis Date    Anxiety     Arnold-Chiari malformation, type I ([address] 12/11/2017    Arnold-Chiari malformation, type II ([address] 12/11/2017    Chiari I malformation ([address]     Concussion 11/2013    Depression     Migraine without aura and without status migrainosus, not intractable 12/11/2017     History reviewed. No pertinent surgical history. I have reviewed the following from the nursing documentation.     Family History   Problem Relation Age of Onset    High Blood Pressure Mother     High Blood Pressure Father     Allergy (Severe) Brother     Heart Disease Maternal Grandmother     Heart Disease Maternal Grandfather     Heart Disease Paternal Grandmother     Heart Disease Paternal Grandfather     Diabetes Maternal Cousin      Social needed one time. Do not use more than 2 tablets in 24 hours. Do not use more than 8 tablets in one month. 9 tablet 2    levonorgestrel-ethinyl estradiol (INTROVALE) 0.15-0.03 MG per tablet Take 1 tablet by mouth daily       Allergies   Allergen Reactions    Percocet [Oxycodone-Acetaminophen] Itching    Wellbutrin [Bupropion] Other (See Comments)     Suspects caused headache    Amoxicillin-Pot Clavulanate Nausea And Vomiting       REVIEW OF SYSTEMS  10 systems reviewed, pertinent positives per HPI otherwise noted to be negative     PHYSICAL EXAM  BP (!) 141/99   Pulse 81   Temp 99 °F (37.2 °C) (Oral)   Resp 12   Ht 5' 2\" (1.575 m)   Wt 160 lb (72.6 kg)   SpO2 100%   BMI 29.26 kg/m²   GENERAL APPEARANCE: Awake and alert. Cooperative. In no obvious distress. HEAD: Normocephalic. Atraumatic. EYES: PERRL. EOM's grossly intact. ENT: Mucous membranes are pink and moist.   NECK: Supple. HEART: RRR. No murmurs. LUNGS: Respirations unlabored. CTAB. Good air exchange. ABDOMEN: Soft. Non-distended. Non-tender. No masses. No organomegaly. No guarding or rebound. EXTREMITIES: No peripheral edema. Moves all extremities equally. All extremities neurovascularly intact. There is a very small amount of swelling on the lateral aspect of the ankle inferior to the malleolus  SKIN: Warm and dry. No acute rashes. NEUROLOGICAL: Alert and oriented. Strength 5/5, sensation intact. Gait normal.  Is able to wiggle her toes  PSYCHIATRIC: Normal mood and affect. No HI or SI expressed to me. RADIOLOGY    If acquired see below     EKG:     If acquired see below       ED COURSE/MDM      Radiographs patient has no fracture so it lists this point I think she has a an ankle sprain she will be placed in an Aircast and sent on her way plus minus crutches    ED Course as of Jan 26 2305   Ashwin Gee Jan 26, 2020 2305 FINDINGS:  No evidence of acute fracture or dislocation. Normal alignment of the ankle  mortise.   No focal osseous

## 2020-01-27 NOTE — ED NOTES
Patient provided with discharge instructions and any prescriptions. Air Cast applied to affected ankle. Follow-up reviewed with patient/family. No further questions verbalized at this time. Vital signs and patient stable upon discharge.         Naveed Arechiga RN  01/26/20 5093

## 2020-01-29 RX ORDER — FOLIC ACID 1 MG/1
TABLET ORAL
Qty: 90 TABLET | Refills: 0 | Status: SHIPPED | OUTPATIENT
Start: 2020-01-29 | End: 2020-04-30

## 2020-01-29 RX ORDER — DULOXETIN HYDROCHLORIDE 30 MG/1
CAPSULE, DELAYED RELEASE ORAL
Qty: 30 CAPSULE | Refills: 1 | Status: SHIPPED | OUTPATIENT
Start: 2020-01-29 | End: 2020-03-09 | Stop reason: SDUPTHER

## 2020-03-04 NOTE — PROGRESS NOTES
injury. Range of motion is unremarkable. There is no gross instability. There are no rashes, ulcerations or lesions. Strength and tone are normal.    Radiology:     X-rays obtained and reviewed in office:  Views 3  Location left foot  Impression shows no evidence of osseous lesion she is skeletally mature          Assessment : Patient has left foot plantar fasciitis    Impression:  Encounter Diagnosis   Name Primary?  Foot pain, left Yes       Office Procedures:  No orders of the defined types were placed in this encounter. Treatment Plan:  The etiology of plantar fasciitis and it's appropriate treatment were discussed in great detail. Wrote out her plan of care and copied to the media section of her chart. I gave her meloxicam 15 mg p.o. daily and discussed the side effects I will get her to physical therapy and see her back in 2 weeks.   If she is not improving I would inject her and put her in a boot put her on prednisone keep her off of her feet over her spring break

## 2020-03-05 ENCOUNTER — OFFICE VISIT (OUTPATIENT)
Dept: ORTHOPEDIC SURGERY | Age: 25
End: 2020-03-05
Payer: COMMERCIAL

## 2020-03-05 VITALS — WEIGHT: 160.05 LBS | BODY MASS INDEX: 29.45 KG/M2 | HEIGHT: 62 IN

## 2020-03-05 PROCEDURE — 99203 OFFICE O/P NEW LOW 30 MIN: CPT | Performed by: ORTHOPAEDIC SURGERY

## 2020-03-05 RX ORDER — MELOXICAM 15 MG/1
15 TABLET ORAL DAILY
Qty: 30 TABLET | Refills: 0 | Status: SHIPPED | OUTPATIENT
Start: 2020-03-05 | End: 2021-04-22

## 2020-03-09 RX ORDER — DULOXETIN HYDROCHLORIDE 30 MG/1
CAPSULE, DELAYED RELEASE ORAL
Qty: 30 CAPSULE | Refills: 1 | Status: SHIPPED | OUTPATIENT
Start: 2020-03-09 | End: 2020-04-01

## 2020-03-23 RX ORDER — TOPIRAMATE 25 MG/1
TABLET ORAL
Qty: 360 TABLET | Refills: 0 | Status: SHIPPED | OUTPATIENT
Start: 2020-03-23 | End: 2020-04-30 | Stop reason: SDUPTHER

## 2020-04-01 RX ORDER — DULOXETIN HYDROCHLORIDE 30 MG/1
CAPSULE, DELAYED RELEASE ORAL
Qty: 30 CAPSULE | Refills: 1 | Status: SHIPPED | OUTPATIENT
Start: 2020-04-01 | End: 2020-04-30 | Stop reason: SDUPTHER

## 2020-04-30 ENCOUNTER — TELEMEDICINE (OUTPATIENT)
Dept: INTERNAL MEDICINE CLINIC | Age: 25
End: 2020-04-30

## 2020-04-30 VITALS — RESPIRATION RATE: 12 BRPM | HEIGHT: 62 IN | WEIGHT: 165 LBS | BODY MASS INDEX: 30.36 KG/M2

## 2020-04-30 PROCEDURE — 99214 OFFICE O/P EST MOD 30 MIN: CPT | Performed by: INTERNAL MEDICINE

## 2020-04-30 RX ORDER — TOPIRAMATE 50 MG/1
50 TABLET, FILM COATED ORAL NIGHTLY
Qty: 90 TABLET | Refills: 0 | Status: SHIPPED | OUTPATIENT
Start: 2020-04-30 | End: 2020-07-27 | Stop reason: DRUGHIGH

## 2020-04-30 RX ORDER — DULOXETIN HYDROCHLORIDE 30 MG/1
30 CAPSULE, DELAYED RELEASE ORAL DAILY
Qty: 90 CAPSULE | Refills: 0 | Status: SHIPPED | OUTPATIENT
Start: 2020-04-30 | End: 2020-07-24 | Stop reason: DRUGHIGH

## 2020-04-30 RX ORDER — FOLIC ACID 1 MG/1
TABLET ORAL
Qty: 90 TABLET | Refills: 0 | Status: SHIPPED | OUTPATIENT
Start: 2020-04-30 | End: 2020-08-17 | Stop reason: SDUPTHER

## 2020-04-30 ASSESSMENT — ENCOUNTER SYMPTOMS
VOMITING: 0
WHEEZING: 0
ABDOMINAL PAIN: 0
SHORTNESS OF BREATH: 0
RHINORRHEA: 0
NAUSEA: 0

## 2020-04-30 NOTE — PROGRESS NOTES
2020    TELEHEALTH EVALUATION -- Audio/Visual (During YOYJT-46 public health emergency)    HPI:    Jan Torres (:  1995) has requested an audio/video evaluation for the following concern(s):    Patient is here for a follow up. She has migraines. She takes Topamax for prophylaxis. She has taken two Imitrex in one two months. She saw ortho for left foot pain. She was diagnosed with left plantar fasciitis. She is seeing a counselor for anxiety. She takes Cymbalta. She gained weight. She is trying to watch her diet but she is not been exercising a whole lot. 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 Review of Systems   Constitutional: Negative for appetite change and fatigue. HENT: Negative for postnasal drip and rhinorrhea. Respiratory: Negative for shortness of breath and wheezing. Cardiovascular: Negative for chest pain and palpitations. Gastrointestinal: Negative for abdominal pain, nausea and vomiting. Musculoskeletal: Negative for joint swelling. Skin: Negative for rash. Neurological: Positive for headaches. Negative for light-headedness. Psychiatric/Behavioral: Negative for sleep disturbance. The patient is nervous/anxious. Prior to Visit Medications    Medication Sig Taking? Authorizing Provider   folic acid (FOLVITE) 1 MG tablet TAKE 1 TABLET BY MOUTH EVERY DAY Yes Olive Seip, MD   DULoxetine (CYMBALTA) 30 MG extended release capsule TAKE 1 CAPSULE BY MOUTH EVERY NIGHT Yes Olive Seip, MD   topiramate (TOPAMAX) 25 MG tablet TAKE 2 TABLETS BY MOUTH TWICE A DAY Yes Olive Seip, MD   meloxicam (MOBIC) 15 MG tablet Take 1 tablet by mouth daily Yes Fanta Hunter MD   SUMAtriptan (IMITREX) 100 MG tablet Take one tablet for a severe headache/migraine.  May repeat one tablet in 2 hours if needed one time. Do not use more than 2 tablets in 24 hours. Do not use more than 8 tablets in one month. Yes Lucio Boone MD   levonorgestrel-ethinyl estradiol ([place]) 0.15-0.03 MG per tablet Take 1 tablet by mouth daily Yes Historical Provider, MD       Social History     Tobacco Use    Smoking status: Never Smoker    Smokeless tobacco: Never Used   Substance Use Topics    Alcohol use: No    Drug use: No        Allergies   Allergen Reactions    Percocet [Oxycodone-Acetaminophen] Itching    Wellbutrin [Bupropion] Other (See Comments)     Suspects caused headache    Amoxicillin-Pot Clavulanate Nausea And Vomiting   ,   Past Medical History:   Diagnosis Date    Anxiety     Arnold-Chiari malformation, type I ([facility] 12/11/2017    [facility], [facility]    [facility]ari [facility]     Concussion 11/2013    Depression     Migraine without aura and without status migrainosus, not intractable 12/11/2017   , History reviewed. No pertinent surgical history. ,   Family History   Problem Relation Age of Onset    High Blood Pressure Mother     High Blood Pressure Father     Allergy (Severe) Brother     Heart Disease Maternal Grandmother     Heart Disease Maternal Grandfather     Heart Disease Paternal Grandmother     Heart Disease Paternal Grandfather     Diabetes Maternal Cousin        PHYSICAL EXAMINATION:  [ INSTRUCTIONS:  \"[x]\" Indicates a positive item  \"[]\" Indicates a negative item  -- DELETE ALL ITEMS NOT EXAMINED]  Vital Signs: (As obtained by patient/caregiver or practitioner observation)    Resp 12   Ht 5' 2\" (1.575 m)   Wt 165 lb (74.8 kg)   BMI 30.18 kg/m²         Constitutional: [x] Appears well-developed and well-nourished [x] No apparent distress                           Mental status  [x] Alert and awake  [x] Oriented to person/place/time [x]Able to follow commands       Eyes:  EOM    [x]  Normal  [] Abnormal-  Sclera  [x]  Normal  [] Abnormal - HENT:   [x] Normocephalic, atraumatic. [] Abnormal   [x] Mouth/Throat: Mucous membranes are moist.      External Ears [x] Normal  [] Abnormal-      Neck: [x] No visualized mass      Pulmonary/Chest: [x] Respiratory effort normal.  [x] No visualized signs of difficulty breathing or respiratory distress        [] Abnormal-      Musculoskeletal:   [x] Normal gait with no signs of ataxia         [x] Normal range of motion of neck        [] Abnormal-         Neurological:        [x] No Facial Asymmetry (Cranial nerve 7 motor function) (limited exam to video visit)                       [] No gaze palsy        [] Abnormal-         Skin:                     [] No significant exanthematous lesions or discoloration noted on facial skin         [] Abnormal-                                  Psychiatric:           [x] Normal Affect [x] No Hallucinations        [] Abnormal-      Other pertinent observable physical exam findings-     ASSESSMENT/PLAN:  1. Migraine without aura and without status migrainosus, not intractable  -Headaches are well controlled. Change Topamax to 50 mg at at bedtime. Use Imitrex as needed. 2. Arnold-Chiari malformation, type I ([facility] [address]  -Stable with no issues. 3. Anxiety  -She is seeing a counselor. Seems to be doing well. Continue Cymbalta 30 daily. 4. Weight gain  -She is concerned about her weight gain. Over the last 8 years she has gained 60 pounds. I will order labs. I told her to be more regimented with her diet and exercise. She will try to increase her physical activity. She will drink more water. She will cut back on her calories. - Comprehensive Metabolic Panel; Future  - CBC Auto Differential; Future  - Lipid Panel; Future  - Uric Acid; Future  - TSH without Reflex;  Future  - Vitamin B12  - Vitamin D 25 Hydroxy      Follow up with PCP    Fazal Staples is a 27 y.o. female being evaluated by a Virtual Visit (video visit) encounter to address concerns as mentioned above.  A caregiver was present when appropriate. Due to this being a TeleHealth encounter (During DHXJX-90 public health emergency), evaluation of the following organ systems was limited: Vitals/Constitutional/EENT/Resp/CV/GI//MS/Neuro/Skin/Heme-Lymph-Imm. Pursuant to the emergency declaration under the [address] [place] [address], [address] [place] [address] and the [employer] Act, this Virtual Visit was conducted with patient's (and/or legal guardian's) consent, to reduce the patient's risk of exposure to COVID-19 and provide necessary medical care. The patient (and/or legal guardian) has also been advised to contact this office for worsening conditions or problems, and seek emergency medical treatment and/or call 911 if deemed necessary. Patient identification was verified at the start of the visit: Yes    Total time spent on this encounter: Not billed by time    Services were provided through a video synchronous discussion virtually to substitute for in-person clinic visit. Patient and provider were located at their individual homes. --Roderick Duque MD on 4/30/2020 at 1:19 PM    An electronic signature was used to authenticate this note.

## 2020-05-01 RX ORDER — DULOXETIN HYDROCHLORIDE 30 MG/1
CAPSULE, DELAYED RELEASE ORAL
Qty: 30 CAPSULE | Refills: 1 | Status: SHIPPED | OUTPATIENT
Start: 2020-05-01 | End: 2020-07-24 | Stop reason: DRUGHIGH

## 2020-05-05 ENCOUNTER — HOSPITAL ENCOUNTER (OUTPATIENT)
Age: 25
Discharge: HOME | End: 2020-05-05
Payer: COMMERCIAL

## 2020-05-05 LAB
A/G RATIO: 1.4 (ref 1.1–2.2)
ALBUMIN SERPL-MCNC: 4.1 G/DL (ref 3.4–5)
ALP BLD-CCNC: 79 U/L (ref 40–129)
ALT SERPL-CCNC: 28 U/L (ref 10–40)
ANION GAP SERPL CALCULATED.3IONS-SCNC: 12 MMOL/L (ref 3–16)
AST SERPL-CCNC: 19 U/L (ref 15–37)
BASOPHILS ABSOLUTE: 0.1 K/UL (ref 0–0.2)
BASOPHILS RELATIVE PERCENT: 0.9 %
BILIRUB SERPL-MCNC: 0.4 MG/DL (ref 0–1)
BUN BLDV-MCNC: 15 MG/DL (ref 7–20)
CALCIUM SERPL-MCNC: 9 MG/DL (ref 8.3–10.6)
CHLORIDE BLD-SCNC: 108 MMOL/L (ref 99–110)
CHOLESTEROL, TOTAL: 156 MG/DL (ref 0–199)
CO2: 24 MMOL/L (ref 21–32)
CREAT SERPL-MCNC: 0.8 MG/DL (ref 0.6–1.1)
EOSINOPHILS ABSOLUTE: 0.2 K/UL (ref 0–0.6)
EOSINOPHILS RELATIVE PERCENT: 2.1 %
GFR AFRICAN AMERICAN: >60
GFR NON-AFRICAN AMERICAN: >60
GLOBULIN: 2.9 G/DL
GLUCOSE BLD-MCNC: 75 MG/DL (ref 70–99)
HCT VFR BLD CALC: 44.9 % (ref 36–48)
HDLC SERPL-MCNC: 44 MG/DL (ref 40–60)
HEMOGLOBIN: 15 G/DL (ref 12–16)
LDL CHOLESTEROL CALCULATED: 96 MG/DL
LYMPHOCYTES ABSOLUTE: 2.9 K/UL (ref 1–5.1)
LYMPHOCYTES RELATIVE PERCENT: 34.1 %
MCH RBC QN AUTO: 28.7 PG (ref 26–34)
MCHC RBC AUTO-ENTMCNC: 33.3 G/DL (ref 31–36)
MCV RBC AUTO: 86.1 FL (ref 80–100)
MONOCYTES ABSOLUTE: 0.6 K/UL (ref 0–1.3)
MONOCYTES RELATIVE PERCENT: 7.1 %
NEUTROPHILS ABSOLUTE: 4.8 K/UL (ref 1.7–7.7)
NEUTROPHILS RELATIVE PERCENT: 55.8 %
PDW BLD-RTO: 12.9 % (ref 12.4–15.4)
PLATELET # BLD: 272 K/UL (ref 135–450)
PMV BLD AUTO: 8.7 FL (ref 5–10.5)
POTASSIUM SERPL-SCNC: 4.1 MMOL/L (ref 3.5–5.1)
RBC # BLD: 5.21 M/UL (ref 4–5.2)
SODIUM BLD-SCNC: 144 MMOL/L (ref 136–145)
TOTAL PROTEIN: 7 G/DL (ref 6.4–8.2)
TRIGL SERPL-MCNC: 79 MG/DL (ref 0–150)
TSH SERPL DL<=0.05 MIU/L-ACNC: 2.92 UIU/ML (ref 0.27–4.2)
URIC ACID, SERUM: 5.8 MG/DL (ref 2.6–6)
VITAMIN B-12: 400 PG/ML (ref 211–911)
VITAMIN D 25-HYDROXY: 36.5 NG/ML
VLDLC SERPL CALC-MCNC: 16 MG/DL
WBC # BLD: 8.6 K/UL (ref 4–11)

## 2020-05-05 PROCEDURE — 84550 ASSAY OF BLOOD/URIC ACID: CPT

## 2020-05-05 PROCEDURE — 85025 COMPLETE CBC W/AUTO DIFF WBC: CPT

## 2020-05-05 PROCEDURE — 82607 VITAMIN B-12: CPT

## 2020-05-05 PROCEDURE — 84443 ASSAY THYROID STIM HORMONE: CPT

## 2020-05-05 PROCEDURE — 80053 COMPREHEN METABOLIC PANEL: CPT

## 2020-05-05 PROCEDURE — 36415 COLL VENOUS BLD VENIPUNCTURE: CPT

## 2020-05-05 PROCEDURE — 80061 LIPID PANEL: CPT

## 2020-05-05 PROCEDURE — 82306 VITAMIN D 25 HYDROXY: CPT

## 2020-06-10 ENCOUNTER — OFFICE VISIT (OUTPATIENT)
Dept: ORTHOPEDIC SURGERY | Age: 25
End: 2020-06-10
Payer: COMMERCIAL

## 2020-06-10 VITALS — WEIGHT: 164.9 LBS | BODY MASS INDEX: 30.35 KG/M2 | HEIGHT: 62 IN

## 2020-06-10 PROCEDURE — 99212 OFFICE O/P EST SF 10 MIN: CPT | Performed by: ORTHOPAEDIC SURGERY

## 2020-06-10 PROCEDURE — 20550 NJX 1 TENDON SHEATH/LIGAMENT: CPT | Performed by: ORTHOPAEDIC SURGERY

## 2020-06-10 PROCEDURE — L4361 PNEUMA/VAC WALK BOOT PRE OTS: HCPCS | Performed by: ORTHOPAEDIC SURGERY

## 2020-06-10 RX ORDER — BUPIVACAINE HYDROCHLORIDE 2.5 MG/ML
4 INJECTION, SOLUTION INFILTRATION; PERINEURAL ONCE
Status: COMPLETED | OUTPATIENT
Start: 2020-06-10 | End: 2020-06-10

## 2020-06-10 RX ORDER — TRIAMCINOLONE ACETONIDE 40 MG/ML
80 INJECTION, SUSPENSION INTRA-ARTICULAR; INTRAMUSCULAR ONCE
Status: COMPLETED | OUTPATIENT
Start: 2020-06-10 | End: 2020-06-10

## 2020-06-10 RX ORDER — LIDOCAINE HYDROCHLORIDE 10 MG/ML
4 INJECTION, SOLUTION INFILTRATION; PERINEURAL ONCE
Status: COMPLETED | OUTPATIENT
Start: 2020-06-10 | End: 2020-06-10

## 2020-06-10 RX ADMIN — TRIAMCINOLONE ACETONIDE 80 MG: 40 INJECTION, SUSPENSION INTRA-ARTICULAR; INTRAMUSCULAR at 14:22

## 2020-06-10 RX ADMIN — LIDOCAINE HYDROCHLORIDE 4 ML: 10 INJECTION, SOLUTION INFILTRATION; PERINEURAL at 14:21

## 2020-06-10 RX ADMIN — BUPIVACAINE HYDROCHLORIDE 10 MG: 2.5 INJECTION, SOLUTION INFILTRATION; PERINEURAL at 14:21

## 2020-06-10 NOTE — PROGRESS NOTES
Subjective: Patient is here for follow-up of her left foot plantar fasciitis. She states that I have not seen her since March 5 of 2020 due to the quarantine. She states that she has been having a significant increase in pain on the plantar medial aspect of the left heel. She would like an injection as we discussed previously  Objective: Physical exam shows tenderness on the plantar medial aspect of the left heel as well as mid arch. She has tight gastrocs and hamstrings. Strength is 5/5 throughout with no focal weakness. No instability through the midfoot antalgic gait  Imaging:  Assessment and plan: I injected her plantar medial heel with Marcaine lidocaine and Kenalog 4/4/2 cc for plantar fasciitis and put her into a high tide boot. We discussed the risks and benefits of these injections. She is in school and working and I gave her a note for work stating she would be sedentary duty for the next 2 to 3 weeks and she agreed with this.   She will follow-up with me as needed only

## 2020-06-15 ENCOUNTER — TELEPHONE (OUTPATIENT)
Dept: ORTHOPEDIC SURGERY | Age: 25
End: 2020-06-15

## 2020-07-24 RX ORDER — DULOXETIN HYDROCHLORIDE 60 MG/1
60 CAPSULE, DELAYED RELEASE ORAL DAILY
Qty: 30 CAPSULE | Refills: 0 | Status: SHIPPED | OUTPATIENT
Start: 2020-07-24 | End: 2020-08-17 | Stop reason: SDUPTHER

## 2020-07-24 NOTE — TELEPHONE ENCOUNTER
----- Message from Sissy Cardenas MD sent at 7/24/2020  2:18 PM EDT -----  Contact: pt- [phone]  Increase her Cymbalta to 60 mg a day. See me in the next 4 weeks.   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

## 2020-07-24 NOTE — TELEPHONE ENCOUNTER
----- Message from Jerry Licona MD sent at 7/24/2020  2:19 PM EDT -----  Contact: pt- [phone]  She can also increase her Topamax to 100 mg at night.  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

## 2020-07-27 RX ORDER — TOPIRAMATE 100 MG/1
100 TABLET, FILM COATED ORAL NIGHTLY
Qty: 90 TABLET | Refills: 0 | Status: SHIPPED | OUTPATIENT
Start: 2020-07-27 | End: 2020-09-18 | Stop reason: SDUPTHER

## 2020-08-17 RX ORDER — DULOXETIN HYDROCHLORIDE 60 MG/1
60 CAPSULE, DELAYED RELEASE ORAL DAILY
Qty: 90 CAPSULE | Refills: 0 | Status: SHIPPED | OUTPATIENT
Start: 2020-08-17 | End: 2020-09-18 | Stop reason: SDUPTHER

## 2020-08-17 RX ORDER — DULOXETIN HYDROCHLORIDE 60 MG/1
CAPSULE, DELAYED RELEASE ORAL
Qty: 30 CAPSULE | Refills: 0 | OUTPATIENT
Start: 2020-08-17

## 2020-08-17 RX ORDER — FOLIC ACID 1 MG/1
TABLET ORAL
Qty: 90 TABLET | Refills: 0 | Status: SHIPPED | OUTPATIENT
Start: 2020-08-17 | End: 2020-09-18 | Stop reason: SDUPTHER

## 2020-09-18 ENCOUNTER — OFFICE VISIT (OUTPATIENT)
Dept: INTERNAL MEDICINE CLINIC | Age: 25
End: 2020-09-18

## 2020-09-18 VITALS
HEART RATE: 70 BPM | SYSTOLIC BLOOD PRESSURE: 110 MMHG | BODY MASS INDEX: 30.18 KG/M2 | TEMPERATURE: 98.3 F | DIASTOLIC BLOOD PRESSURE: 80 MMHG | RESPIRATION RATE: 12 BRPM | WEIGHT: 164 LBS | HEIGHT: 62 IN

## 2020-09-18 PROCEDURE — 99213 OFFICE O/P EST LOW 20 MIN: CPT | Performed by: INTERNAL MEDICINE

## 2020-09-18 RX ORDER — TOPIRAMATE 100 MG/1
100 TABLET, FILM COATED ORAL NIGHTLY
Qty: 90 TABLET | Refills: 0 | Status: SHIPPED | OUTPATIENT
Start: 2020-09-18 | End: 2020-11-18 | Stop reason: SDUPTHER

## 2020-09-18 RX ORDER — DULOXETIN HYDROCHLORIDE 60 MG/1
60 CAPSULE, DELAYED RELEASE ORAL DAILY
Qty: 90 CAPSULE | Refills: 0 | Status: SHIPPED | OUTPATIENT
Start: 2020-09-18 | End: 2020-12-21 | Stop reason: SDUPTHER

## 2020-09-18 RX ORDER — FOLIC ACID 1 MG/1
TABLET ORAL
Qty: 90 TABLET | Refills: 0 | Status: SHIPPED | OUTPATIENT
Start: 2020-09-18 | End: 2021-06-21 | Stop reason: SDUPTHER

## 2020-09-18 ASSESSMENT — ENCOUNTER SYMPTOMS
VOMITING: 0
SHORTNESS OF BREATH: 0
ABDOMINAL PAIN: 0
NAUSEA: 0
RHINORRHEA: 0
WHEEZING: 0

## 2020-09-18 ASSESSMENT — PATIENT HEALTH QUESTIONNAIRE - PHQ9
2. FEELING DOWN, DEPRESSED OR HOPELESS: 0
SUM OF ALL RESPONSES TO PHQ9 QUESTIONS 1 & 2: 0
SUM OF ALL RESPONSES TO PHQ QUESTIONS 1-9: 0
SUM OF ALL RESPONSES TO PHQ QUESTIONS 1-9: 0
1. LITTLE INTEREST OR PLEASURE IN DOING THINGS: 0

## 2020-09-18 NOTE — PROGRESS NOTES
Pulse 70   Temp 98.3 °F (36.8 °C)   Resp 12   Ht 5' 2\" (1.575 m)   Wt 164 lb (74.4 kg)   BMI 30.00 kg/m²     Objective:   Physical Exam  Constitutional:       Appearance: She is well-developed. HENT:      Head: Normocephalic. Eyes:      Conjunctiva/sclera: Conjunctivae normal.      Pupils: Pupils are equal, round, and reactive to light. Neck:      Musculoskeletal: Normal range of motion and neck supple. Thyroid: No thyroid mass or thyromegaly. Vascular: No carotid bruit or JVD. Trachea: Trachea normal.   Cardiovascular:      Rate and Rhythm: Normal rate and regular rhythm. Heart sounds: Normal heart sounds. No murmur. No gallop. Pulmonary:      Effort: Pulmonary effort is normal. No respiratory distress. Breath sounds: Normal breath sounds. No wheezing or rales. Abdominal:      General: Bowel sounds are normal. There is no distension. Palpations: Abdomen is soft. There is no hepatomegaly, splenomegaly or mass. Tenderness: There is no abdominal tenderness. Musculoskeletal: Normal range of motion. Lymphadenopathy:      Cervical: No cervical adenopathy. Skin:     General: Skin is warm and dry. Findings: No rash. Neurological:      Mental Status: She is alert and oriented to person, place, and time. Cranial Nerves: No cranial nerve deficit. Deep Tendon Reflexes: Reflexes are normal and symmetric. Psychiatric:         Behavior: Behavior normal.         Thought Content: Thought content normal.         Judgment: Judgment normal.         Assessment:       Diagnosis Orders   1. Migraine without aura and without status migrainosus, not intractable     2. [facility] malformation[facility] ([facility]     3. Anxiety            Plan:      #  Migraines controlled. #  Anxiety on Cymbalta. #  Arnold Chiari malformation stable.                 Isaura Burns MD

## 2020-10-07 ENCOUNTER — TELEPHONE (OUTPATIENT)
Dept: INTERNAL MEDICINE CLINIC | Age: 25
End: 2020-10-07

## 2020-10-07 RX ORDER — METHYLPREDNISOLONE 4 MG/1
TABLET ORAL
Qty: 1 KIT | Refills: 0 | Status: SHIPPED | OUTPATIENT
Start: 2020-10-07 | End: 2020-10-13

## 2020-10-07 NOTE — TELEPHONE ENCOUNTER
----- Message from John Kingston MD sent at 10/7/2020 12:30 PM EDT -----  Medrol dose pack  ----- Message -----  From: Mikel Pizarro  Sent: 10/7/2020  10:48 AM EDT  To: John Kingston MD    Patient has poison ivy on arms and legs, started over the weekend. Has tried otc creams and has tried benadryl nothing is helping. . wanted a steroid called into her pharmacy [facility] [phone]

## 2020-10-15 ENCOUNTER — TELEPHONE (OUTPATIENT)
Dept: INTERNAL MEDICINE CLINIC | Age: 25
End: 2020-10-15

## 2020-10-15 RX ORDER — METHYLPREDNISOLONE 4 MG/1
TABLET ORAL
Qty: 1 KIT | Refills: 0 | Status: SHIPPED | OUTPATIENT
Start: 2020-10-15 | End: 2020-10-21

## 2020-10-15 NOTE — TELEPHONE ENCOUNTER
----- Message from Altagracia Lock MD sent at 10/15/2020 11:20 AM EDT -----  Do another course of medrol dose pack  ----- Message -----  From: Yovana Riddle  Sent: 10/15/2020  10:45 AM EDT  To: Altagracia Lock MD    You gave her a steroid for poison ivy, it helped her arm, but her leg is still not healing and itching, is there anything else she can take  [facility]

## 2020-11-02 ENCOUNTER — TELEPHONE (OUTPATIENT)
Dept: INTERNAL MEDICINE CLINIC | Age: 25
End: 2020-11-02

## 2020-11-02 NOTE — TELEPHONE ENCOUNTER
----- Message from Christel Cristina MD sent at 11/2/2020  2:55 PM EST -----  Contact: Janey Bahena [phone]  yes  ----- Message -----  From: Nina Galindo  Sent: 11/2/2020   1:27 PM EST  To: Christel Cristina MD    Pt cannot pickup the 100 mg because she is not due for it until 12/26 but she can pickup the 25 mg and take 4 daily. Is this ok?  ----- Message -----  From: Christel Cristina MD  Sent: 11/2/2020  12:35 PM EST  To: Aminah Pinedafermin Shay    100 mg po qhs   ----- Message -----  From: Nina Galindo  Sent: 11/2/2020  10:27 AM EST  To: Christel Cristina MD    Pt states she has been taking Topamax 100 mg once daily. Pharmacy has a script for 25 mg  2 tabs bid. Pt wanting to know how she should be taking this? Pt also states pharmacy told her she got this med filled in September and is not due but pt said she has been moving and may have misplaced them. Please advise.  ----- Message -----  From: Meg Quach  Sent: 11/2/2020  10:02 AM EST  To: Nina Galindo     Patient states that she is having difficulty getting her Topamax refilled at [facility] due to change of dosage instructed Dr. Robbin Machuca at her last visit. Pharmacy states she is not due for refills yet.  Thank you tsh

## 2020-11-17 ENCOUNTER — PATIENT MESSAGE (OUTPATIENT)
Dept: INTERNAL MEDICINE CLINIC | Age: 25
End: 2020-11-17

## 2020-11-17 NOTE — TELEPHONE ENCOUNTER
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 I am having severe anxiety and depression with headaches daily. Thank you.

## 2020-11-18 ENCOUNTER — NURSE TRIAGE (OUTPATIENT)
Dept: OTHER | Facility: CLINIC | Age: 25
End: 2020-11-18

## 2020-11-18 ENCOUNTER — TELEPHONE (OUTPATIENT)
Dept: INTERNAL MEDICINE CLINIC | Age: 25
End: 2020-11-18

## 2020-11-18 RX ORDER — TOPIRAMATE 100 MG/1
150 TABLET, FILM COATED ORAL NIGHTLY
Qty: 45 TABLET | Refills: 0 | Status: SHIPPED | OUTPATIENT
Start: 2020-11-18 | End: 2020-12-14 | Stop reason: SDUPTHER

## 2020-11-18 RX ORDER — BUPROPION HYDROCHLORIDE 150 MG/1
150 TABLET ORAL DAILY
Qty: 30 TABLET | Refills: 0 | Status: SHIPPED | OUTPATIENT
Start: 2020-11-18 | End: 2020-12-11

## 2020-11-18 NOTE — TELEPHONE ENCOUNTER
----- Message from Anita Peter MD sent at 11/18/2020  3:59 PM EST -----  Contact: [phone]  Have her try it again  ----- Message -----  From: Leopoldo Median  Sent: 11/18/2020   2:59 PM EST  To: Anita Peter MD    Pt states last time she took wellbutrin she got migraines. Wanting to know if that is a side effect and if she should start this med?   ----- Message -----  From: Anita Peter MD  Sent: 11/18/2020   2:53 PM EST  To: Leopoldo Median    Get COVID test done first  ----- Message -----  From: Carlos Garvey  Sent: 11/18/2020   2:48 PM EST  To: Anita Peter MD    Pt said she needs to get a covid test done per her employer said she started feeling sick last night said she feels feverish and has a headache with congestion. Also didn't know if you could call something in for her.

## 2020-11-18 NOTE — TELEPHONE ENCOUNTER
----- Message from Ania Ramirez MD sent at 11/18/2020  3:59 PM EST -----  Contact: [phone]  Have her try it again  ----- Message -----  From: Sandra Speaker  Sent: 11/18/2020   2:59 PM EST  To: Ania Ramirez MD    Pt states last time she took wellbutrin she got migraines. Wanting to know if that is a side effect and if she should start this med?   ----- Message -----  From: Ania Ramirez MD  Sent: 11/18/2020   2:53 PM EST  To: Sandra Speaker    Get COVID test done first  ----- Message -----  From: Ankit Leslie  Sent: 11/18/2020   2:48 PM EST  To: Ania Ramirez MD    Pt said she needs to get a covid test done per her employer said she started feeling sick last night said she feels feverish and has a headache with congestion. Also didn't know if you could call something in for her.

## 2020-11-18 NOTE — TELEPHONE ENCOUNTER
----- Message from Miranda Vasquez MD sent at 11/18/2020  2:53 PM EST -----  Contact: [phone]  Get COVID test done first  ----- Message -----  From: Emeli Carmine  Sent: 11/18/2020   2:48 PM EST  To: Miranda Vasquez MD    Pt said she needs to get a covid test done per her employer said she started feeling sick last night said she feels feverish and has a headache with congestion. Also didn't know if you could call something in for her.

## 2020-11-30 ENCOUNTER — TELEPHONE (OUTPATIENT)
Dept: INTERNAL MEDICINE CLINIC | Age: 25
End: 2020-11-30

## 2020-11-30 NOTE — TELEPHONE ENCOUNTER
----- Message from Abigailnisha Austin sent at 11/30/2020  1:18 PM EST -----  Contact: Pt- [phone]  Per Dr. Roselyn Closs with [place]  ----- Message -----  From: Nita Abarca  Sent: 11/24/2020   2:59 PM EST  To: Randal Peñaloza MD    Pt states she no longer needs return to work paper. She just needs a physical to return to school. She feels a lot better and has no covid symptoms. ----- Message -----  From: Nita Abarca  Sent: 11/24/2020   2:57 PM EST  To: Nita Abarca      ----- Message -----  From: Darrel Dorantes  Sent: 11/24/2020   2:25 PM EST  To: Randal Peñaloza MD    Pt was symptomatic for Covid but she tested negative. Pt works on the [employer] and before she can go back to school or do her clinicals, pt needs to have a physical. Please advise.       Pt- [phone]

## 2020-12-11 RX ORDER — BUPROPION HYDROCHLORIDE 150 MG/1
TABLET ORAL
Qty: 30 TABLET | Refills: 0 | Status: SHIPPED | OUTPATIENT
Start: 2020-12-11 | End: 2021-01-11

## 2020-12-14 RX ORDER — TOPIRAMATE 100 MG/1
150 TABLET, FILM COATED ORAL NIGHTLY
Qty: 45 TABLET | Refills: 0 | Status: SHIPPED | OUTPATIENT
Start: 2020-12-14 | End: 2021-02-15

## 2020-12-21 RX ORDER — DULOXETIN HYDROCHLORIDE 60 MG/1
60 CAPSULE, DELAYED RELEASE ORAL DAILY
Qty: 90 CAPSULE | Refills: 0 | Status: SHIPPED | OUTPATIENT
Start: 2020-12-21 | End: 2021-05-24

## 2021-01-11 ENCOUNTER — TELEPHONE (OUTPATIENT)
Dept: INTERNAL MEDICINE CLINIC | Age: 26
End: 2021-01-11

## 2021-01-11 RX ORDER — BUPROPION HYDROCHLORIDE 150 MG/1
TABLET ORAL
Qty: 30 TABLET | Refills: 1 | Status: SHIPPED | OUTPATIENT
Start: 2021-01-11 | End: 2021-02-08

## 2021-01-28 RX ORDER — TOPIRAMATE 25 MG/1
TABLET ORAL
Qty: 360 TABLET | Refills: 0 | Status: SHIPPED | OUTPATIENT
Start: 2021-01-28 | End: 2021-04-27

## 2021-02-08 RX ORDER — BUPROPION HYDROCHLORIDE 150 MG/1
TABLET ORAL
Qty: 30 TABLET | Refills: 0 | Status: SHIPPED | OUTPATIENT
Start: 2021-02-08 | End: 2021-03-08

## 2021-02-15 RX ORDER — TOPIRAMATE 100 MG/1
TABLET, FILM COATED ORAL
Qty: 45 TABLET | Refills: 0 | Status: SHIPPED | OUTPATIENT
Start: 2021-02-15 | End: 2021-03-15

## 2021-03-02 RX ORDER — BUSPIRONE HYDROCHLORIDE 10 MG/1
10 TABLET ORAL 3 TIMES DAILY
Qty: 90 TABLET | Refills: 0 | Status: SHIPPED | OUTPATIENT
Start: 2021-03-02 | End: 2021-03-24

## 2021-03-02 NOTE — TELEPHONE ENCOUNTER
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 Pt is already on Wellbutrin and Cymbalta and wants to know if you can give her something that is fast acting. Please advise.       Pt- [phone]    [facility]    Future appt- 3/16/2021  Past appt- 9/18/2020

## 2021-03-08 RX ORDER — BUPROPION HYDROCHLORIDE 150 MG/1
TABLET ORAL
Qty: 30 TABLET | Refills: 0 | Status: SHIPPED | OUTPATIENT
Start: 2021-03-08 | End: 2021-04-05

## 2021-03-15 RX ORDER — TOPIRAMATE 100 MG/1
TABLET, FILM COATED ORAL
Qty: 45 TABLET | Refills: 0 | Status: SHIPPED | OUTPATIENT
Start: 2021-03-15 | End: 2021-04-05

## 2021-03-24 RX ORDER — BUSPIRONE HYDROCHLORIDE 10 MG/1
TABLET ORAL
Qty: 90 TABLET | Refills: 0 | Status: SHIPPED | OUTPATIENT
Start: 2021-03-24 | End: 2021-04-12 | Stop reason: SDUPTHER

## 2021-04-05 RX ORDER — BUPROPION HYDROCHLORIDE 150 MG/1
TABLET ORAL
Qty: 30 TABLET | Refills: 1 | Status: SHIPPED | OUTPATIENT
Start: 2021-04-05 | End: 2021-05-11

## 2021-04-05 RX ORDER — TOPIRAMATE 100 MG/1
TABLET, FILM COATED ORAL
Qty: 45 TABLET | Refills: 0 | Status: SHIPPED | OUTPATIENT
Start: 2021-04-05 | End: 2021-06-21 | Stop reason: SDUPTHER

## 2021-04-12 RX ORDER — BUSPIRONE HYDROCHLORIDE 10 MG/1
TABLET ORAL
Qty: 270 TABLET | Refills: 0 | Status: SHIPPED | OUTPATIENT
Start: 2021-04-12 | End: 2021-06-21

## 2021-04-23 ENCOUNTER — HOSPITAL ENCOUNTER (OUTPATIENT)
Age: 26
Discharge: HOME | End: 2021-04-23
Payer: COMMERCIAL

## 2021-04-23 PROCEDURE — U0003 INFECTIOUS AGENT DETECTION BY NUCLEIC ACID (DNA OR RNA); SEVERE ACUTE RESPIRATORY SYNDROME CORONAVIRUS 2 (SARS-COV-2) (CORONAVIRUS DISEASE [COVID-19]), AMPLIFIED PROBE TECHNIQUE, MAKING USE OF HIGH THROUGHPUT TECHNOLOGIES AS DESCRIBED BY CMS-2020-01-R: HCPCS

## 2021-04-23 PROCEDURE — U0005 INFEC AGEN DETEC AMPLI PROBE: HCPCS

## 2021-04-24 LAB — SARS-COV-2: NOT DETECTED

## 2021-04-27 ENCOUNTER — ANESTHESIA EVENT (OUTPATIENT)
Dept: OPERATING ROOM | Age: 26
End: 2021-04-27
Payer: COMMERCIAL

## 2021-04-27 ENCOUNTER — OFFICE VISIT (OUTPATIENT)
Dept: INTERNAL MEDICINE CLINIC | Age: 26
End: 2021-04-27

## 2021-04-27 VITALS
BODY MASS INDEX: 28.71 KG/M2 | WEIGHT: 156 LBS | SYSTOLIC BLOOD PRESSURE: 105 MMHG | HEIGHT: 62 IN | HEART RATE: 100 BPM | DIASTOLIC BLOOD PRESSURE: 80 MMHG | TEMPERATURE: 98.2 F

## 2021-04-27 DIAGNOSIS — F41.9 ANXIETY: ICD-10-CM

## 2021-04-27 DIAGNOSIS — G93.5 ARNOLD-CHIARI MALFORMATION, TYPE I (HCC): ICD-10-CM

## 2021-04-27 DIAGNOSIS — G43.009 MIGRAINE WITHOUT AURA AND WITHOUT STATUS MIGRAINOSUS, NOT INTRACTABLE: ICD-10-CM

## 2021-04-27 DIAGNOSIS — M72.2 PLANTAR FASCIITIS, BILATERAL: ICD-10-CM

## 2021-04-27 DIAGNOSIS — Z01.818 PRE-OPERATIVE EXAMINATION FOR INTERNAL MEDICINE: Primary | ICD-10-CM

## 2021-04-27 DIAGNOSIS — R35.1 NOCTURIA: ICD-10-CM

## 2021-04-27 PROCEDURE — 99213 OFFICE O/P EST LOW 20 MIN: CPT | Performed by: PHYSICIAN ASSISTANT

## 2021-04-27 NOTE — PROGRESS NOTES
Subjective:      Darron Grissom is a 23 y.o. female with anxiety, migraine headaches, and Arnold-Chiari malformation type I who presents to the office today for a preoperative consultation at the request of surgeon Dr. Stephanie Barrera who plans on performing EPF of left foot 4/28, EPF of right foot 5/12/21 . Planned anesthesia is General.       Past Medical History:   Diagnosis Date    Anxiety     Arnold-Chiari malformation, type I ([address] 12/11/2017    Arnold-Chiari malformation, type II ([address] 12/11/2017    Chiari I malformation ([address]     Concussion 11/2013    Depression     Migraine without aura and without status migrainosus, not intractable 12/11/2017     Patient Active Problem List    Diagnosis Date Noted    Anxiety 09/18/2020    Migraine without aura and without status migrainosus, not intractable 12/11/2017    Arnold-Chiari malformation, type I ([address] 12/11/2017     No past surgical history on file.   Family History   Problem Relation Age of Onset    High Blood Pressure Mother     High Blood Pressure Father     Allergy (Severe) Brother     Heart Disease Maternal Grandmother     Heart Disease Maternal Grandfather     Heart Disease Paternal Grandmother     Heart Disease Paternal Grandfather     Diabetes Maternal Cousin      Social History     Socioeconomic History    Marital status: Single     Spouse name: Not on file    Number of children: Not on file    Years of education: Not on file    Highest education level: Not on file   Occupational History    Not on file   Social Needs    Financial resource strain: Not on file    Food insecurity     Worry: Not on file     Inability: Not on file    Transportation needs     Medical: Not on file     Non-medical: Not on file   Tobacco Use    Smoking status: Never Smoker    Smokeless tobacco: Never Used   Substance and Sexual Activity    Alcohol use: No    Drug use: No    Sexual activity: Yes     Partners: Male   Lifestyle    Physical activity     Days per week: Not on file     Minutes per session: Not on file    Stress: Not on file   Relationships    Social connections     Talks on phone: Not on file     Gets together: Not on file     Attends Yarsanism service: Not on file     Active member of club or organization: Not on file     Attends meetings of clubs or organizations: Not on file     Relationship status: Not on file    Intimate partner violence     Fear of current or ex partner: Not on file     Emotionally abused: Not on file     Physically abused: Not on file     Forced sexual activity: Not on file   Other Topics Concern    Not on file   Social History Narrative    Not on file     Current Outpatient Medications   Medication Sig Dispense Refill    busPIRone (BUSPAR) 10 MG tablet TAKE 1 TABLET BY MOUTH THREE TIMES A DAY (Patient taking differently: Take 10 mg by mouth 3 times daily as needed (anxiety) TAKE 1 TABLET BY MOUTH THREE TIMES A DAY) 270 tablet 0    topiramate (TOPAMAX) 100 MG tablet TAKE 1 AND 1/2 TABLETS BY MOUTH NIGHTLY 45 tablet 0    buPROPion (WELLBUTRIN XL) 150 MG extended release tablet TAKE 1 TABLET BY MOUTH EVERY DAY 30 tablet 1    DULoxetine (CYMBALTA) 60 MG extended release capsule Take 1 capsule by mouth daily 90 capsule 0    folic acid (FOLVITE) 1 MG tablet TAKE 1 TABLET BY MOUTH EVERY DAY 90 tablet 0    SUMAtriptan (IMITREX) 100 MG tablet Take one tablet for a severe headache/migraine. May repeat one tablet in 2 hours if needed one time. Do not use more than 2 tablets in 24 hours. Do not use more than 8 tablets in one month. 9 tablet 2    levonorgestrel-ethinyl estradiol (INTROVALE) 0.15-0.03 MG per tablet Take 1 tablet by mouth daily       No current facility-administered medications for this visit.       Allergies   Allergen Reactions    Percocet [Oxycodone-Acetaminophen] Itching    Amoxicillin-Pot Clavulanate Nausea And Vomiting     Review of Systems  A comprehensive review of systems was negative except for: Genitourinary: positive for nocturia     Planned anesthesia: General   Known anesthesia problems: Denies   Bleeding risk:  Low risk   Personal or FH of DVT/PE:  Denies   Patient objection to receiving blood products: No objections      Objective:         /80   Pulse 100   Temp 98.2 °F (36.8 °C)   Ht 5' 2\" (1.575 m)   Wt 156 lb (70.8 kg)   BMI 28.53 kg/m²     Gen: No distress. Alert. Eyes: PERRL. No sclera icterus. No conjunctival injection. ENT: No discharge. Pharynx clear. Neck: No JVD. Trachea midline. Resp: No accessory muscle use. No crackles. No wheezes. No rhonchi. CV: Regular rate. Regular rhythm. No murmur. No rub. No edema. GI: Non-tender. Non-distended. Normal bowel sounds. Skin: Warm and dry. No nodule on exposed extremities. No rash on exposed extremities. M/S: No cyanosis. No joint deformity. No clubbing. Neuro: Awake. Grossly nonfocal    Psych: Oriented x 3. No anxiety or agitation. Assessment:       Diagnosis Orders   1. Pre-operative examination for internal medicine     2. Plantar fasciitis, bilateral     3. Anxiety     4. Migraine without aura and without status migrainosus, not intractable     5. Arnold-Chiari malformation, type I ([facility]     6. Nocturia  External Referral To Physical Therapy         23 y.o. female with planned surgery as above. Plan:     Patient medically cleared for above planned procedure. #Nocturia- longstanding issue (years). No DM, no diuretics, no excessive fluid intake per her report. lifestyle changes discussed and handout given, pelvic floor exercises discussed and handout given.   Referral to pelvic floor PT if no improvement     Daris Rinne PA-C  4/27/2021 10:56 AM

## 2021-04-27 NOTE — ANESTHESIA PRE PROCEDURE
tablet TAKE 1 AND 1/2 TABLETS BY MOUTH NIGHTLY 45 tablet 0    buPROPion (WELLBUTRIN XL) 150 MG extended release tablet TAKE 1 TABLET BY MOUTH EVERY DAY 30 tablet 1    DULoxetine (CYMBALTA) 60 MG extended release capsule Take 1 capsule by mouth daily 90 capsule 0    folic acid (FOLVITE) 1 MG tablet TAKE 1 TABLET BY MOUTH EVERY DAY 90 tablet 0    SUMAtriptan (IMITREX) 100 MG tablet Take one tablet for a severe headache/migraine. May repeat one tablet in 2 hours if needed one time. Do not use more than 2 tablets in 24 hours. Do not use more than 8 tablets in one month. 9 tablet 2    levonorgestrel-ethinyl estradiol (INTROVALE) 0.15-0.03 MG per tablet Take 1 tablet by mouth daily         Allergies: Allergies   Allergen Reactions    Percocet [Oxycodone-Acetaminophen] Itching    Amoxicillin-Pot Clavulanate Nausea And Vomiting       Problem List:    Patient Active Problem List   Diagnosis Code    Migraine without aura and without status migrainosus, not intractable G43.009    Arnold-Chiari malformation, type I ([facility]) G93.5    Anxiety F41.9    Nocturia R35.1       Past Medical History:        Diagnosis Date    Anxiety     Arnold-Chiari malformation, type I ([facility] 12/11/2017    Arnold-Chiari malformation, type II ([facility] 12/11/2017    Chiari I malformation ([facility]     Concussion 11/2013    Depression     Migraine without aura and without status migrainosus, not intractable 12/11/2017       Past Surgical History:  History reviewed. No pertinent surgical history. Social History:    Social History     Tobacco Use    Smoking status: Never Smoker    Smokeless tobacco: Never Used   Substance Use Topics    Alcohol use:  No                                Counseling given: Not Answered      Vital Signs (Current):   Vitals:    04/22/21 1218   Weight: 155 lb (70.3 kg)   Height: 5' 2\" (1.575 m)                                              BP Readings from Last 3 Encounters:   04/27/21 105/80   09/18/20 110/80 01/26/20 (!) 141/99       NPO Status:                                                                                 BMI:   Wt Readings from Last 3 Encounters:   04/27/21 156 lb (70.8 kg)   09/18/20 164 lb (74.4 kg)   06/10/20 164 lb 14.5 oz (74.8 kg)     Body mass index is 28.35 kg/m². CBC:   Lab Results   Component Value Date    WBC 8.6 05/05/2020    RBC 5.21 05/05/2020    HGB 15.0 05/05/2020    HCT 44.9 05/05/2020    MCV 86.1 05/05/2020    RDW 12.9 05/05/2020     05/05/2020       CMP:   Lab Results   Component Value Date     05/05/2020    K 4.1 05/05/2020     05/05/2020    CO2 24 05/05/2020    BUN 15 05/05/2020    CREATININE 0.8 05/05/2020    GFRAA >60 05/05/2020    AGRATIO 1.4 05/05/2020    LABGLOM >60 05/05/2020    GLUCOSE 75 05/05/2020    PROT 7.0 05/05/2020    CALCIUM 9.0 05/05/2020    BILITOT 0.4 05/05/2020    ALKPHOS 79 05/05/2020    AST 19 05/05/2020    ALT 28 05/05/2020       POC Tests: No results for input(s): POCGLU, POCNA, POCK, POCCL, POCBUN, POCHEMO, POCHCT in the last 72 hours.     Coags: No results found for: PROTIME, INR, APTT    HCG (If Applicable):   Lab Results   Component Value Date    PREGTESTUR Negative 04/03/2018        ABGs: No results found for: PHART, PO2ART, JVM2ZOM, CDO1YIX, BEART, T1YRPJKQ     Type & Screen (If Applicable):  No results found for: LABABO, LABRH    Drug/Infectious Status (If Applicable):  No results found for: HIV, HEPCAB    COVID-19 Screening (If Applicable):   Lab Results   Component Value Date    COVID19 Not Detected 04/23/2021           Anesthesia Evaluation  Patient summary reviewed and Nursing notes reviewed no history of anesthetic complications:   Airway: Mallampati: II  TM distance: >3 FB   Neck ROM: full  Mouth opening: > = 3 FB Dental: normal exam         Pulmonary:Negative Pulmonary ROS                              Cardiovascular:Negative CV ROS                      Neuro/Psych:   (+) headaches: migraine headaches, psychiatric history: GI/Hepatic/Renal: Neg GI/Hepatic/Renal ROS       (-) GERD, liver disease and no renal disease       Endo/Other: Negative Endo/Other ROS       (-) diabetes mellitus               Abdominal:           Vascular: negative vascular ROS. Anesthesia Plan      general     ASA 2     (I discussed with the patient the risks and benefits of PIV, general anesthesia, IV Narcotics, PACU. All questions were answered the patient agrees with the plan)  Induction: intravenous. MIPS: Prophylactic antiemetics administered. Anesthetic plan and risks discussed with patient. Plan discussed with CRNA.                 Negrita Emerson MD   4/27/2021

## 2021-04-27 NOTE — PATIENT INSTRUCTIONS
Initial measures  There are several intervention strategies that should be considered in all patients:  ?Decreasing nocturnal urine output:  Reduction of overall fluid intake, especially if it is excessive, and specific reduction of evening intake [73]. However, care should be taken not to restrict fluid intake in older patients who have borderline or inadequate fluid intake to meet daily needs. Reduction of salt intake [74,75], particularly for those with congestive heart failure [61]. Reduction of evening consumption of diuretic fluids, including caffeine and alcohol. Double-voiding prior to bedtime, which can be described as urinating while sitting comfortably on the toilet (men included), leaning slightly forward, and then waiting for 20 to 30 seconds to urinate again, may be helpful in individuals who feel that they have not completely emptied their bladder. ?Decreasing the impact of nocturia  The use of a handheld urinal or a bedside commode may be helpful for patients bothered by trips to and from the bathroom at night. Nighttime ambulation may be particularly worrisome or hazardous in older adult patients or others at high risk of falling, so attention to environmental safety (eg, clear pathway, lighting) is also important. In addition, adopting good sleep hygiene can reduce episodes of nighttime voiding. Elements include sleeping in a quiet room with low lighting and appropriate temperature, avoiding nighttime use of electronic devices, and avoiding daytime naps. Pelvic floor muscle exercises  Pelvic floor muscle exercises (PFME, or Kegel exercises) - see info below     Patient Education        Kegel Exercises: Care Instructions  Overview     Kegel exercises strengthen muscles around the bladder. These muscles control the flow of urine. Kegel exercises are sometime called \"pelvic floor\" exercises. They can help prevent urine leakage and keep the pelvic organs in place.   Kegel exercises can strengthen pelvic muscles that have been weakened by age, pregnancy, childbirth, and surgery. They may help prevent or treat urine leakage. You do Kegel exercises by tightening the muscles you use when you urinate. You will likely need to do these exercises for several weeks to get better. Follow-up care is a key part of your treatment and safety. Be sure to make and go to all appointments, and call your doctor if you are having problems. It's also a good idea to know your test results and keep a list of the medicines you take. How can you care for yourself at home? · Do Kegel exercises. ? Find the muscles you need to strengthen. To do this, tighten the muscles that stop your urine while you are going to the bathroom. These are the same muscles you squeeze during Kegel exercises. ? Squeeze the muscles as hard as you can. Your belly and thighs should not move. ? Hold the squeeze for 3 seconds. Then relax for 3 seconds. ? Start with 3 seconds, and then add 1 second each week until you are able to squeeze for 10 seconds. ? Repeat the exercise 10 to 15 times for each session. Do three or more sessions each day. · You can check to see if you are using the right muscles. ? Tighten the muscles that help you stop passing gas or keep you from urinating. Make sure you aren't using your stomach, leg, or buttock muscles. ? Place a finger in your vagina and squeeze around it. You are doing them right when you feel pressure around your finger. Your doctor may also suggest that you put special weights in your vagina while you do the exercises. · Check with your doctor if you don't notice a difference after trying these exercises for several weeks. Your doctor may suggest getting help from a physical therapist or recommend other treatment. Where can you learn more? Go to [email or web address] and sign in to your [employer] account.  Enter R910 in the [employer] box to learn more about \"Kegel Exercises: Care Instructions. \"     If you do not have an account, please click on the \"Sign Up Now\" link. Current as of: June 29, 2020               Content Version: 12.8  © 0355-2761 [employer], Incorporated. Care instructions adapted under license by [facility] ([facility]). If you have questions about a medical condition or this instruction, always ask your healthcare professional. [place] [address] any warranty or liability for your use of this information.        [address], [address]   Call  - ask for pelvic floor physical therapist to address frequent nighttime urination

## 2021-04-28 ENCOUNTER — ANESTHESIA (OUTPATIENT)
Dept: OPERATING ROOM | Age: 26
End: 2021-04-28
Payer: COMMERCIAL

## 2021-04-28 ENCOUNTER — HOSPITAL ENCOUNTER (OUTPATIENT)
Age: 26
Setting detail: OUTPATIENT SURGERY
Discharge: HOME | End: 2021-04-28
Attending: PODIATRIST | Admitting: PODIATRIST
Payer: COMMERCIAL

## 2021-04-28 VITALS
TEMPERATURE: 96.8 F | OXYGEN SATURATION: 100 % | RESPIRATION RATE: 5 BRPM | SYSTOLIC BLOOD PRESSURE: 94 MMHG | DIASTOLIC BLOOD PRESSURE: 57 MMHG

## 2021-04-28 VITALS
RESPIRATION RATE: 18 BRPM | BODY MASS INDEX: 28.52 KG/M2 | SYSTOLIC BLOOD PRESSURE: 126 MMHG | OXYGEN SATURATION: 100 % | TEMPERATURE: 97.8 F | HEIGHT: 62 IN | DIASTOLIC BLOOD PRESSURE: 84 MMHG | HEART RATE: 88 BPM | WEIGHT: 155 LBS

## 2021-04-28 LAB — PREGNANCY, URINE: NEGATIVE

## 2021-04-28 PROCEDURE — 2580000003 HC RX 258: Performed by: ANESTHESIOLOGY

## 2021-04-28 PROCEDURE — 84703 CHORIONIC GONADOTROPIN ASSAY: CPT

## 2021-04-28 PROCEDURE — 2500000003 HC RX 250 WO HCPCS

## 2021-04-28 PROCEDURE — 2500000003 HC RX 250 WO HCPCS: Performed by: PODIATRIST

## 2021-04-28 PROCEDURE — 3600000013 HC SURGERY LEVEL 3 ADDTL 15MIN: Performed by: PODIATRIST

## 2021-04-28 PROCEDURE — 7100000001 HC PACU RECOVERY - ADDTL 15 MIN: Performed by: PODIATRIST

## 2021-04-28 PROCEDURE — 7100000000 HC PACU RECOVERY - FIRST 15 MIN: Performed by: PODIATRIST

## 2021-04-28 PROCEDURE — 2500000003 HC RX 250 WO HCPCS: Performed by: NURSE ANESTHETIST, CERTIFIED REGISTERED

## 2021-04-28 PROCEDURE — 3700000001 HC ADD 15 MINUTES (ANESTHESIA): Performed by: PODIATRIST

## 2021-04-28 PROCEDURE — 3600000003 HC SURGERY LEVEL 3 BASE: Performed by: PODIATRIST

## 2021-04-28 PROCEDURE — 7100000011 HC PHASE II RECOVERY - ADDTL 15 MIN: Performed by: PODIATRIST

## 2021-04-28 PROCEDURE — 3700000000 HC ANESTHESIA ATTENDED CARE: Performed by: PODIATRIST

## 2021-04-28 PROCEDURE — 6360000002 HC RX W HCPCS: Performed by: PODIATRIST

## 2021-04-28 PROCEDURE — 7100000010 HC PHASE II RECOVERY - FIRST 15 MIN: Performed by: PODIATRIST

## 2021-04-28 PROCEDURE — 2709999900 HC NON-CHARGEABLE SUPPLY: Performed by: PODIATRIST

## 2021-04-28 PROCEDURE — 6360000002 HC RX W HCPCS: Performed by: NURSE ANESTHETIST, CERTIFIED REGISTERED

## 2021-04-28 PROCEDURE — 2720000010 HC SURG SUPPLY STERILE: Performed by: PODIATRIST

## 2021-04-28 RX ORDER — BUPIVACAINE HYDROCHLORIDE 5 MG/ML
INJECTION, SOLUTION EPIDURAL; INTRACAUDAL PRN
Status: DISCONTINUED | OUTPATIENT
Start: 2021-04-28 | End: 2021-04-28 | Stop reason: ALTCHOICE

## 2021-04-28 RX ORDER — SODIUM CHLORIDE, SODIUM LACTATE, POTASSIUM CHLORIDE, CALCIUM CHLORIDE 600; 310; 30; 20 MG/100ML; MG/100ML; MG/100ML; MG/100ML
INJECTION, SOLUTION INTRAVENOUS CONTINUOUS
Status: DISCONTINUED | OUTPATIENT
Start: 2021-04-28 | End: 2021-04-28 | Stop reason: HOSPADM

## 2021-04-28 RX ORDER — FENTANYL CITRATE 50 UG/ML
INJECTION, SOLUTION INTRAMUSCULAR; INTRAVENOUS PRN
Status: DISCONTINUED | OUTPATIENT
Start: 2021-04-28 | End: 2021-04-28 | Stop reason: SDUPTHER

## 2021-04-28 RX ORDER — DEXAMETHASONE SODIUM PHOSPHATE 4 MG/ML
INJECTION, SOLUTION INTRA-ARTICULAR; INTRALESIONAL; INTRAMUSCULAR; INTRAVENOUS; SOFT TISSUE PRN
Status: DISCONTINUED | OUTPATIENT
Start: 2021-04-28 | End: 2021-04-28 | Stop reason: SDUPTHER

## 2021-04-28 RX ORDER — LIDOCAINE HYDROCHLORIDE 10 MG/ML
2 INJECTION, SOLUTION INFILTRATION; PERINEURAL
Status: COMPLETED | OUTPATIENT
Start: 2021-04-28 | End: 2021-04-28

## 2021-04-28 RX ORDER — MIDAZOLAM HYDROCHLORIDE 1 MG/ML
INJECTION INTRAMUSCULAR; INTRAVENOUS PRN
Status: DISCONTINUED | OUTPATIENT
Start: 2021-04-28 | End: 2021-04-28 | Stop reason: SDUPTHER

## 2021-04-28 RX ORDER — LIDOCAINE HYDROCHLORIDE 20 MG/ML
INJECTION, SOLUTION INFILTRATION; PERINEURAL PRN
Status: DISCONTINUED | OUTPATIENT
Start: 2021-04-28 | End: 2021-04-28 | Stop reason: SDUPTHER

## 2021-04-28 RX ORDER — LIDOCAINE HYDROCHLORIDE 10 MG/ML
INJECTION, SOLUTION EPIDURAL; INFILTRATION; INTRACAUDAL; PERINEURAL
Status: COMPLETED
Start: 2021-04-28 | End: 2021-04-28

## 2021-04-28 RX ORDER — PROPOFOL 10 MG/ML
INJECTION, EMULSION INTRAVENOUS PRN
Status: DISCONTINUED | OUTPATIENT
Start: 2021-04-28 | End: 2021-04-28 | Stop reason: SDUPTHER

## 2021-04-28 RX ORDER — ONDANSETRON 2 MG/ML
INJECTION INTRAMUSCULAR; INTRAVENOUS PRN
Status: DISCONTINUED | OUTPATIENT
Start: 2021-04-28 | End: 2021-04-28 | Stop reason: SDUPTHER

## 2021-04-28 RX ORDER — HYDRALAZINE HYDROCHLORIDE 20 MG/ML
5 INJECTION INTRAMUSCULAR; INTRAVENOUS EVERY 10 MIN PRN
Status: DISCONTINUED | OUTPATIENT
Start: 2021-04-28 | End: 2021-04-28 | Stop reason: HOSPADM

## 2021-04-28 RX ORDER — LABETALOL HYDROCHLORIDE 5 MG/ML
5 INJECTION, SOLUTION INTRAVENOUS EVERY 10 MIN PRN
Status: DISCONTINUED | OUTPATIENT
Start: 2021-04-28 | End: 2021-04-28 | Stop reason: HOSPADM

## 2021-04-28 RX ORDER — ONDANSETRON 2 MG/ML
4 INJECTION INTRAMUSCULAR; INTRAVENOUS EVERY 10 MIN PRN
Status: DISCONTINUED | OUTPATIENT
Start: 2021-04-28 | End: 2021-04-28 | Stop reason: HOSPADM

## 2021-04-28 RX ORDER — BUPIVACAINE HYDROCHLORIDE 5 MG/ML
INJECTION, SOLUTION EPIDURAL; INTRACAUDAL
Status: DISCONTINUED
Start: 2021-04-28 | End: 2021-04-28 | Stop reason: HOSPADM

## 2021-04-28 RX ADMIN — DEXAMETHASONE SODIUM PHOSPHATE 8 MG: 4 INJECTION, SOLUTION INTRAMUSCULAR; INTRAVENOUS at 07:28

## 2021-04-28 RX ADMIN — LIDOCAINE HYDROCHLORIDE 0.1 ML: 10 INJECTION, SOLUTION INFILTRATION; PERINEURAL at 06:24

## 2021-04-28 RX ADMIN — MIDAZOLAM 2 MG: 1 INJECTION INTRAMUSCULAR; INTRAVENOUS at 07:18

## 2021-04-28 RX ADMIN — FENTANYL CITRATE 100 MCG: 50 INJECTION INTRAMUSCULAR; INTRAVENOUS at 07:18

## 2021-04-28 RX ADMIN — SODIUM CHLORIDE, POTASSIUM CHLORIDE, SODIUM LACTATE AND CALCIUM CHLORIDE: 600; 310; 30; 20 INJECTION, SOLUTION INTRAVENOUS at 07:48

## 2021-04-28 RX ADMIN — ONDANSETRON 4 MG: 2 INJECTION, SOLUTION INTRAMUSCULAR; INTRAVENOUS at 07:28

## 2021-04-28 RX ADMIN — SODIUM CHLORIDE, POTASSIUM CHLORIDE, SODIUM LACTATE AND CALCIUM CHLORIDE: 600; 310; 30; 20 INJECTION, SOLUTION INTRAVENOUS at 06:25

## 2021-04-28 RX ADMIN — LIDOCAINE HYDROCHLORIDE 0.1 ML: 10 INJECTION, SOLUTION EPIDURAL; INFILTRATION; INTRACAUDAL; PERINEURAL at 06:24

## 2021-04-28 RX ADMIN — Medication 2000 MG: at 07:18

## 2021-04-28 RX ADMIN — PROPOFOL 20 MG: 10 INJECTION, EMULSION INTRAVENOUS at 07:48

## 2021-04-28 RX ADMIN — LIDOCAINE HYDROCHLORIDE 100 MG: 20 INJECTION, SOLUTION INFILTRATION; PERINEURAL at 07:20

## 2021-04-28 RX ADMIN — PROPOFOL 200 MG: 10 INJECTION, EMULSION INTRAVENOUS at 07:24

## 2021-04-28 ASSESSMENT — PULMONARY FUNCTION TESTS
PIF_VALUE: 16
PIF_VALUE: 16
PIF_VALUE: 1
PIF_VALUE: 16
PIF_VALUE: 12
PIF_VALUE: 2
PIF_VALUE: 16
PIF_VALUE: 16
PIF_VALUE: 15
PIF_VALUE: 1
PIF_VALUE: 16
PIF_VALUE: 0
PIF_VALUE: 16
PIF_VALUE: 3

## 2021-04-28 ASSESSMENT — PAIN - FUNCTIONAL ASSESSMENT: PAIN_FUNCTIONAL_ASSESSMENT: 0-10

## 2021-04-28 ASSESSMENT — PAIN SCALES - GENERAL: PAINLEVEL_OUTOF10: 3

## 2021-04-28 NOTE — OP NOTE
Operative Note      Patient: Alo Cohen  YOB: 1995  MRN: [id number]    Date of Procedure: 4/28/2021    Pre-Op Diagnosis: CHRONIC PLANTAR FASCIITIS LEFT FOOT    Post-Op Diagnosis: Same       Procedure(s):  ENDOSCOPIC PLANTAR FASCIOTOMY LEFT FOOT    Surgeon(s):  Tracee Salinas DPM    Assistant:   * No surgical staff found *    Anesthesia: General    Estimated Blood Loss (mL): Minimal    Complications: None    Specimens:   * No specimens in log *    Implants:  * No implants in log *      Drains: * No LDAs found *      Detailed Description of Procedure:   Pre-Op Diagnosis: LEFT FOOT CHRONIC PLANTAR FASCIITIS     Post-Op Diagnosis: Same       Procedure(s):  ENDOSCOPIC PLANTAR FASCIOTOMY LEFT FOOT     Surgeon(s):  Tracee Salinas DPM     Assistant:   Surgical Assistant: Christa Gee     Anesthesia: General     Estimated Blood Loss (mL): Minimal     Complications: None     Specimens:   * No specimens in log *     Implants:  * No implants in log *      Drains: * No LDAs found *      INDICATIONS FOR PROCEDURE:  Patient had signs and symptoms both clinically  and radiographically consistent with the preoperative above-mentioned  diagnoses. Having failed conservative treatment, it was determined that the  patient would benefit from surgical intervention. All potential risks,  benefits, and complications were discussed with the patient prior to  scheduling the surgery. Patient wished to proceed to the elected surgery and  an informed written consent was obtained and can be found in the medical  record. DETAILS OF PROCEDURE:  Patient was brought back from the preoperative area  and placed on the operating room table in a normal supine position. A  pneumatic ankle tourniquet was placed around the patients well-padded   ankle and then the patients lower extremity were then scrubbed, prepped,  and draped in a usual sterile fashion.   Esmarch bandage was utilized to  exsanguinate the patients left ower extremity. The tourniquet was then  inflated to 250 mmHg. Procedure #1:  EPF left foot  Attention was directed towards the medial aspect of the patients left foot  where a 15 blade was utilized to make a 1 cm vertical incision at the medial  aspect of the foot at the medial calcaneal tubercle approximately 6 cm distal  to the posterior aspect of the calcaneus and approximately 2 cm superior to  the weightbearing surface. Dissection was then continued bluntly through the  subcutaneous tissue. A curved Gayathri hemostat was then utilized to bluntly  dissect the remaining subcutaneous and deep tissue down to the level of the  plantar fascia. Then, a fascial elevator from the EPF instrumentation tray  was then inserted into the incision and slid under the plantar fascia. The  fascial elevator was then passed from medial to lateral in a horizontal  manner across the plantar aspect of the plantar fascia until tenting of the  lateral skin was observed. The elevator was then removed and a trocar and  obturator cannula was then inserted from medial to lateral along the same  path. A 15 blade was then used to make a vertical incision 1 cm in length  along the lateral aspect of the patients left foot to allow for the trocar  and obturator cannula to pass through the skin. The trocar was then removed. Then, the obturator cannula was then cleaned with a sterile cotton-tip  applicator. Then, 4 mm 30-degree endoscope was then inserted into the  obturator laterally. The obturator was then turned 360 degrees so as to  visualize and confirm proper placement of the device, being plantar to all  fibers of the fascial band. Proper placement was confirmed. A blunt probe  was then placed medial to lateral through the obturator to an extent  approximately 50% to 60% of width of the plantar fascia.   A hook blade from  the EPF instrumentation tray was then passed through the cannula to the  medial side, while visualizing the

## 2021-04-28 NOTE — PROGRESS NOTES
Discharge  instructions reviewed. Pt and family verbalize understanding with no further questions. VSS. Pt discharged via [facility] to car. Assessment unchanged.

## 2021-04-28 NOTE — ANESTHESIA POSTPROCEDURE EVALUATION
Department of Anesthesiology  Postprocedure Note    Patient: Flora Cotto  MRN: [id number]  YOB: 1995  Date of evaluation: 4/28/2021  Time:  9:51 AM     Procedure Summary     Date: 04/28/21 Room / Location: [facility] EG OR 02 / [facility]    Anesthesia Start: 1785 Anesthesia Stop: 8584    Procedure: ENDOSCOPIC PLANTAR FASCIOTOMY LEFT FOOT (Left Foot) Diagnosis: (CHRONIC PLANTAR FASCIITIS LEFT FOOT)    Surgeons: Jaden Gayle DPM Responsible Provider: Sky Bahena MD    Anesthesia Type: general ASA Status: 2          Anesthesia Type: general    Noelle Phase I: Noelle Score: 9    Noelle Phase II: Noelle Score: 10    Last vitals: Reviewed and per EMR flowsheets.        Anesthesia Post Evaluation    Patient location during evaluation: PACU  Level of consciousness: awake  Airway patency: patent  Nausea & Vomiting: no nausea  Complications: no  Cardiovascular status: blood pressure returned to baseline  Respiratory status: acceptable  Hydration status: euvolemic

## 2021-05-06 ENCOUNTER — ANESTHESIA EVENT (OUTPATIENT)
Dept: OPERATING ROOM | Age: 26
End: 2021-05-06
Payer: COMMERCIAL

## 2021-05-06 RX ORDER — SODIUM CHLORIDE 0.9 % (FLUSH) 0.9 %
10 SYRINGE (ML) INJECTION PRN
Status: CANCELLED | OUTPATIENT
Start: 2021-05-06

## 2021-05-06 RX ORDER — SODIUM CHLORIDE, SODIUM LACTATE, POTASSIUM CHLORIDE, CALCIUM CHLORIDE 600; 310; 30; 20 MG/100ML; MG/100ML; MG/100ML; MG/100ML
INJECTION, SOLUTION INTRAVENOUS CONTINUOUS
Status: CANCELLED | OUTPATIENT
Start: 2021-05-06

## 2021-05-06 RX ORDER — SODIUM CHLORIDE 9 MG/ML
25 INJECTION, SOLUTION INTRAVENOUS PRN
Status: CANCELLED | OUTPATIENT
Start: 2021-05-06

## 2021-05-07 ENCOUNTER — HOSPITAL ENCOUNTER (OUTPATIENT)
Age: 26
Discharge: HOME | End: 2021-05-07
Payer: COMMERCIAL

## 2021-05-07 PROCEDURE — U0005 INFEC AGEN DETEC AMPLI PROBE: HCPCS

## 2021-05-07 PROCEDURE — U0003 INFECTIOUS AGENT DETECTION BY NUCLEIC ACID (DNA OR RNA); SEVERE ACUTE RESPIRATORY SYNDROME CORONAVIRUS 2 (SARS-COV-2) (CORONAVIRUS DISEASE [COVID-19]), AMPLIFIED PROBE TECHNIQUE, MAKING USE OF HIGH THROUGHPUT TECHNOLOGIES AS DESCRIBED BY CMS-2020-01-R: HCPCS

## 2021-05-08 LAB — SARS-COV-2: NOT DETECTED

## 2021-05-11 RX ORDER — BUPROPION HYDROCHLORIDE 150 MG/1
TABLET ORAL
Qty: 30 TABLET | Refills: 1 | Status: SHIPPED | OUTPATIENT
Start: 2021-05-11 | End: 2021-05-27 | Stop reason: SDUPTHER

## 2021-05-12 ENCOUNTER — ANESTHESIA (OUTPATIENT)
Dept: OPERATING ROOM | Age: 26
End: 2021-05-12
Payer: COMMERCIAL

## 2021-05-12 ENCOUNTER — HOSPITAL ENCOUNTER (OUTPATIENT)
Age: 26
Setting detail: OUTPATIENT SURGERY
Discharge: HOME | End: 2021-05-12
Attending: PODIATRIST | Admitting: PODIATRIST
Payer: COMMERCIAL

## 2021-05-12 VITALS
RESPIRATION RATE: 11 BRPM | OXYGEN SATURATION: 100 % | DIASTOLIC BLOOD PRESSURE: 72 MMHG | SYSTOLIC BLOOD PRESSURE: 122 MMHG

## 2021-05-12 VITALS
DIASTOLIC BLOOD PRESSURE: 76 MMHG | BODY MASS INDEX: 28.52 KG/M2 | HEART RATE: 89 BPM | HEIGHT: 62 IN | RESPIRATION RATE: 16 BRPM | SYSTOLIC BLOOD PRESSURE: 110 MMHG | OXYGEN SATURATION: 99 % | TEMPERATURE: 97.7 F | WEIGHT: 155 LBS

## 2021-05-12 LAB — PREGNANCY, URINE: NEGATIVE

## 2021-05-12 PROCEDURE — 6360000002 HC RX W HCPCS: Performed by: NURSE ANESTHETIST, CERTIFIED REGISTERED

## 2021-05-12 PROCEDURE — 6360000002 HC RX W HCPCS

## 2021-05-12 PROCEDURE — 2709999900 HC NON-CHARGEABLE SUPPLY: Performed by: PODIATRIST

## 2021-05-12 PROCEDURE — 6360000002 HC RX W HCPCS: Performed by: PODIATRIST

## 2021-05-12 PROCEDURE — 7100000001 HC PACU RECOVERY - ADDTL 15 MIN: Performed by: PODIATRIST

## 2021-05-12 PROCEDURE — 7100000011 HC PHASE II RECOVERY - ADDTL 15 MIN: Performed by: PODIATRIST

## 2021-05-12 PROCEDURE — 6370000000 HC RX 637 (ALT 250 FOR IP)

## 2021-05-12 PROCEDURE — 2500000003 HC RX 250 WO HCPCS: Performed by: ANESTHESIOLOGY

## 2021-05-12 PROCEDURE — 2500000003 HC RX 250 WO HCPCS

## 2021-05-12 PROCEDURE — 2500000003 HC RX 250 WO HCPCS: Performed by: PODIATRIST

## 2021-05-12 PROCEDURE — 84703 CHORIONIC GONADOTROPIN ASSAY: CPT

## 2021-05-12 PROCEDURE — 3700000001 HC ADD 15 MINUTES (ANESTHESIA): Performed by: PODIATRIST

## 2021-05-12 PROCEDURE — 2580000003 HC RX 258: Performed by: ANESTHESIOLOGY

## 2021-05-12 PROCEDURE — 3600000003 HC SURGERY LEVEL 3 BASE: Performed by: PODIATRIST

## 2021-05-12 PROCEDURE — 2500000003 HC RX 250 WO HCPCS: Performed by: NURSE ANESTHETIST, CERTIFIED REGISTERED

## 2021-05-12 PROCEDURE — 7100000000 HC PACU RECOVERY - FIRST 15 MIN: Performed by: PODIATRIST

## 2021-05-12 PROCEDURE — 2720000010 HC SURG SUPPLY STERILE: Performed by: PODIATRIST

## 2021-05-12 PROCEDURE — 3600000013 HC SURGERY LEVEL 3 ADDTL 15MIN: Performed by: PODIATRIST

## 2021-05-12 PROCEDURE — 3700000000 HC ANESTHESIA ATTENDED CARE: Performed by: PODIATRIST

## 2021-05-12 PROCEDURE — 7100000010 HC PHASE II RECOVERY - FIRST 15 MIN: Performed by: PODIATRIST

## 2021-05-12 RX ORDER — PROPOFOL 10 MG/ML
INJECTION, EMULSION INTRAVENOUS PRN
Status: DISCONTINUED | OUTPATIENT
Start: 2021-05-12 | End: 2021-05-12 | Stop reason: SDUPTHER

## 2021-05-12 RX ORDER — HYDROCODONE BITARTRATE AND ACETAMINOPHEN 5; 325 MG/1; MG/1
1 TABLET ORAL ONCE
Status: COMPLETED | OUTPATIENT
Start: 2021-05-12 | End: 2021-05-12

## 2021-05-12 RX ORDER — SODIUM CHLORIDE, SODIUM LACTATE, POTASSIUM CHLORIDE, CALCIUM CHLORIDE 600; 310; 30; 20 MG/100ML; MG/100ML; MG/100ML; MG/100ML
INJECTION, SOLUTION INTRAVENOUS CONTINUOUS
Status: DISCONTINUED | OUTPATIENT
Start: 2021-05-12 | End: 2021-05-12 | Stop reason: HOSPADM

## 2021-05-12 RX ORDER — DIPHENHYDRAMINE HYDROCHLORIDE 50 MG/ML
6.25 INJECTION INTRAMUSCULAR; INTRAVENOUS
Status: DISCONTINUED | OUTPATIENT
Start: 2021-05-12 | End: 2021-05-12 | Stop reason: HOSPADM

## 2021-05-12 RX ORDER — BUPIVACAINE HYDROCHLORIDE 5 MG/ML
INJECTION, SOLUTION EPIDURAL; INTRACAUDAL PRN
Status: DISCONTINUED | OUTPATIENT
Start: 2021-05-12 | End: 2021-05-12 | Stop reason: ALTCHOICE

## 2021-05-12 RX ORDER — BUPIVACAINE HYDROCHLORIDE 5 MG/ML
INJECTION, SOLUTION EPIDURAL; INTRACAUDAL
Status: DISCONTINUED
Start: 2021-05-12 | End: 2021-05-12 | Stop reason: HOSPADM

## 2021-05-12 RX ORDER — KETOROLAC TROMETHAMINE 30 MG/ML
INJECTION, SOLUTION INTRAMUSCULAR; INTRAVENOUS
Status: COMPLETED
Start: 2021-05-12 | End: 2021-05-12

## 2021-05-12 RX ORDER — LIDOCAINE HYDROCHLORIDE 10 MG/ML
0.2 INJECTION, SOLUTION EPIDURAL; INFILTRATION; INTRACAUDAL; PERINEURAL ONCE
Status: COMPLETED | OUTPATIENT
Start: 2021-05-12 | End: 2021-05-12

## 2021-05-12 RX ORDER — ONDANSETRON 2 MG/ML
INJECTION INTRAMUSCULAR; INTRAVENOUS PRN
Status: DISCONTINUED | OUTPATIENT
Start: 2021-05-12 | End: 2021-05-12 | Stop reason: SDUPTHER

## 2021-05-12 RX ORDER — OXYCODONE HYDROCHLORIDE AND ACETAMINOPHEN 5; 325 MG/1; MG/1
1 TABLET ORAL PRN
Status: DISCONTINUED | OUTPATIENT
Start: 2021-05-12 | End: 2021-05-12

## 2021-05-12 RX ORDER — LIDOCAINE HYDROCHLORIDE 20 MG/ML
INJECTION, SOLUTION INFILTRATION; PERINEURAL PRN
Status: DISCONTINUED | OUTPATIENT
Start: 2021-05-12 | End: 2021-05-12 | Stop reason: SDUPTHER

## 2021-05-12 RX ORDER — SODIUM CHLORIDE 9 MG/ML
25 INJECTION, SOLUTION INTRAVENOUS PRN
Status: DISCONTINUED | OUTPATIENT
Start: 2021-05-12 | End: 2021-05-12 | Stop reason: HOSPADM

## 2021-05-12 RX ORDER — HYDRALAZINE HYDROCHLORIDE 20 MG/ML
5 INJECTION INTRAMUSCULAR; INTRAVENOUS EVERY 30 MIN PRN
Status: DISCONTINUED | OUTPATIENT
Start: 2021-05-12 | End: 2021-05-12 | Stop reason: HOSPADM

## 2021-05-12 RX ORDER — SODIUM CHLORIDE 0.9 % (FLUSH) 0.9 %
10 SYRINGE (ML) INJECTION EVERY 12 HOURS SCHEDULED
Status: DISCONTINUED | OUTPATIENT
Start: 2021-05-12 | End: 2021-05-12 | Stop reason: HOSPADM

## 2021-05-12 RX ORDER — DEXAMETHASONE SODIUM PHOSPHATE 4 MG/ML
INJECTION, SOLUTION INTRA-ARTICULAR; INTRALESIONAL; INTRAMUSCULAR; INTRAVENOUS; SOFT TISSUE PRN
Status: DISCONTINUED | OUTPATIENT
Start: 2021-05-12 | End: 2021-05-12 | Stop reason: SDUPTHER

## 2021-05-12 RX ORDER — FENTANYL CITRATE 50 UG/ML
INJECTION, SOLUTION INTRAMUSCULAR; INTRAVENOUS PRN
Status: DISCONTINUED | OUTPATIENT
Start: 2021-05-12 | End: 2021-05-12 | Stop reason: SDUPTHER

## 2021-05-12 RX ORDER — LABETALOL HYDROCHLORIDE 5 MG/ML
5 INJECTION, SOLUTION INTRAVENOUS
Status: DISCONTINUED | OUTPATIENT
Start: 2021-05-12 | End: 2021-05-12 | Stop reason: HOSPADM

## 2021-05-12 RX ORDER — ONDANSETRON 2 MG/ML
4 INJECTION INTRAMUSCULAR; INTRAVENOUS EVERY 30 MIN PRN
Status: DISCONTINUED | OUTPATIENT
Start: 2021-05-12 | End: 2021-05-12 | Stop reason: HOSPADM

## 2021-05-12 RX ORDER — LIDOCAINE HYDROCHLORIDE 10 MG/ML
INJECTION, SOLUTION EPIDURAL; INFILTRATION; INTRACAUDAL; PERINEURAL
Status: COMPLETED
Start: 2021-05-12 | End: 2021-05-12

## 2021-05-12 RX ORDER — OXYCODONE HYDROCHLORIDE AND ACETAMINOPHEN 5; 325 MG/1; MG/1
2 TABLET ORAL PRN
Status: DISCONTINUED | OUTPATIENT
Start: 2021-05-12 | End: 2021-05-12

## 2021-05-12 RX ORDER — KETOROLAC TROMETHAMINE 30 MG/ML
30 INJECTION, SOLUTION INTRAMUSCULAR; INTRAVENOUS ONCE
Status: COMPLETED | OUTPATIENT
Start: 2021-05-12 | End: 2021-05-12

## 2021-05-12 RX ORDER — HYDROCODONE BITARTRATE AND ACETAMINOPHEN 5; 325 MG/1; MG/1
TABLET ORAL
Status: COMPLETED
Start: 2021-05-12 | End: 2021-05-12

## 2021-05-12 RX ADMIN — HYDROCODONE BITARTRATE AND ACETAMINOPHEN 1 TABLET: 5; 325 TABLET ORAL at 11:50

## 2021-05-12 RX ADMIN — FAMOTIDINE 20 MG: 10 INJECTION, SOLUTION INTRAVENOUS at 09:10

## 2021-05-12 RX ADMIN — DEXAMETHASONE SODIUM PHOSPHATE 8 MG: 4 INJECTION, SOLUTION INTRAMUSCULAR; INTRAVENOUS at 10:29

## 2021-05-12 RX ADMIN — ONDANSETRON 4 MG: 2 INJECTION, SOLUTION INTRAMUSCULAR; INTRAVENOUS at 10:29

## 2021-05-12 RX ADMIN — FENTANYL CITRATE 50 MCG: 50 INJECTION INTRAMUSCULAR; INTRAVENOUS at 10:33

## 2021-05-12 RX ADMIN — LIDOCAINE HYDROCHLORIDE 80 MG: 20 INJECTION, SOLUTION INFILTRATION; PERINEURAL at 10:29

## 2021-05-12 RX ADMIN — SODIUM CHLORIDE, POTASSIUM CHLORIDE, SODIUM LACTATE AND CALCIUM CHLORIDE: 600; 310; 30; 20 INJECTION, SOLUTION INTRAVENOUS at 09:05

## 2021-05-12 RX ADMIN — LIDOCAINE HYDROCHLORIDE 0.3 ML: 10 INJECTION, SOLUTION EPIDURAL; INFILTRATION; INTRACAUDAL; PERINEURAL at 09:05

## 2021-05-12 RX ADMIN — KETOROLAC TROMETHAMINE 30 MG: 30 INJECTION, SOLUTION INTRAMUSCULAR; INTRAVENOUS at 11:45

## 2021-05-12 RX ADMIN — PROPOFOL 200 MG: 10 INJECTION, EMULSION INTRAVENOUS at 10:29

## 2021-05-12 RX ADMIN — FENTANYL CITRATE 50 MCG: 50 INJECTION INTRAMUSCULAR; INTRAVENOUS at 10:29

## 2021-05-12 RX ADMIN — Medication 2 G: at 10:31

## 2021-05-12 ASSESSMENT — PULMONARY FUNCTION TESTS
PIF_VALUE: 4
PIF_VALUE: 1
PIF_VALUE: 3
PIF_VALUE: 2
PIF_VALUE: 4
PIF_VALUE: 4
PIF_VALUE: 3
PIF_VALUE: 3
PIF_VALUE: 2
PIF_VALUE: 1
PIF_VALUE: 1
PIF_VALUE: 2
PIF_VALUE: 2

## 2021-05-12 ASSESSMENT — PAIN SCALES - GENERAL: PAINLEVEL_OUTOF10: 7

## 2021-05-12 ASSESSMENT — PAIN DESCRIPTION - FREQUENCY: FREQUENCY: CONTINUOUS

## 2021-05-12 NOTE — PROGRESS NOTES
Pt arrived to PACU from OR via stretcher. Report received from RN and anesthesia . Vitals stable. Pt remains drowsy / sedated - responds to voice. Right foot surgical dressing and post op shoe in place. RLE cap refill less than 3 seconds, warm. Will continue to monitor.

## 2021-05-12 NOTE — OP NOTE
Operative Note      Patient: Major Conway  YOB: 1995  MRN: [id number]    Date of Procedure: 5/12/2021    Pre-Op Diagnosis: CHRONIC PLANTAR FASCIITIS RIGHT FOOT    Post-Op Diagnosis: Same       Procedure(s):  ENDOSCOPIC PLANTAR FASCIOTOMY RIGHT FOOT    Surgeon(s):  Isaura Buckner DPM    Assistant:   * No surgical staff found *    Anesthesia: General    Estimated Blood Loss (mL): Minimal    Complications: None    Specimens:   * No specimens in log *    Implants:  * No implants in log *      Drains: * No LDAs found *    INDICATIONS FOR PROCEDURE:  Patient had signs and symptoms both clinically  and radiographically consistent with the preoperative above-mentioned  diagnoses. Having failed conservative treatment, it was determined that the  patient would benefit from surgical intervention. All potential risks,  benefits, and complications were discussed with the patient prior to  scheduling the surgery. Patient wished to proceed to the elected surgery and  an informed written consent was obtained and can be found in the medical  record. DETAILS OF PROCEDURE:  Patient was brought back from the preoperative area  and placed on the operating room table in a normal supine position. A  pneumatic ankle tourniquet was placed around the patients well-padded right  ankle and then the patients right lower extremity was then scrubbed, prepped,  and draped in a usual sterile fashion. Esmarch bandage was utilized to  exsanguinate the patients right lower extremity. The tourniquet was then  inflated to 250 mmHg. Attention was directed towards the medial aspect of the patients right foot  where a 15 blade was utilized to make a 1 cm vertical incision at the medial  aspect of the foot at the medial calcaneal tubercle approximately 6 cm distal  to the posterior aspect of the calcaneus and approximately 2 cm superior to  the weightbearing surface.   Dissection was then continued bluntly through the  subcutaneous tissue. A curved Gayathri hemostat was then utilized to bluntly  dissect the remaining subcutaneous and deep tissue down to the level of the  plantar fascia. Then, a fascial elevator from the EPF instrumentation tray  was then inserted into the incision and slid under the plantar fascia. The  fascial elevator was then passed from medial to lateral in a horizontal  manner across the plantar aspect of the plantar fascia until tenting of the  lateral skin was observed. The elevator was then removed and a trocar and  obturator cannula was then inserted from medial to lateral along the same  path. A 15 blade was then used to make a vertical incision 1 cm in length  along the lateral aspect of the patients right foot to allow for the trocar  and obturator cannula to pass through the skin. The trocar was then removed. Then, the obturator cannula was then cleaned with a sterile cotton-tip  applicator. Then, 4 mm 30-degree endoscope was then inserted into the  obturator laterally. The obturator was then turned 360 degrees so as to  visualize and confirm proper placement of the device, being plantar to all  fibers of the fascial band. Proper placement was confirmed. A blunt probe  was then placed medial to lateral through the obturator to an extent  approximately 50% to 60% of width of the plantar fascia. A hook blade from  the EPF instrumentation tray was then passed through the cannula to the  medial side, while visualizing the plantar fascia on the endoscopy monitor. Approximately 50% to 60% of the plantar fascia was then transected in a  lateral to medial fashion. Following transection of the fascia, the muscle belly of the quadratus  plantae was readily visualized, thereby ensuring complete transection of the  plantar fascia in this region. The blunt probe was then again passed through  the obturator cannula to check for any residual medial plantar fascial  fibers, and none were found. The incision was then vigorously lavaged with  normal saline through the obturator cannula and the obturator cannula was  then removed. Skin incisions were reapproximated with nylon suture in a simple  interrupted suture technique. The foot was then dressed with sterile  Adaptic, 4x4s, 2-inch Brayden, Kerlix, and Coban. The patient tolerated the procedure and anesthesia well and was transferred  from the operating room to the PACU with her vital signs stable,  neurovascular status intact in all aspects of the patients right lower  extremity and a digital cap refill time immediate to all digits of the right  foot.      Electronically signed by Dalia Villegas DPM on 5/12/2021 at 10:58 AM

## 2021-05-12 NOTE — ANESTHESIA PRE PROCEDURE
Department of Anesthesiology  Preprocedure Note       Name:  Dianne Dunham   Age:  23 y.o.  :  1995                                          MRN:  [id number]         Date:  2021      Surgeon: Vikram Stephenson):  Maricruz Valadez DPM    Procedure: Procedure(s):  ENDOSCOPIC PLANTAR FASCIOTOMY RIGHT FOOT    Medications prior to admission:   Prior to Admission medications    Medication Sig Start Date End Date Taking? Authorizing Provider   buPROPion (WELLBUTRIN XL) 150 MG extended release tablet TAKE 1 TABLET BY MOUTH EVERY DAY 21  Yes Herman Duran MD   topiramate (TOPAMAX) 100 MG tablet TAKE 1 AND 1/2 TABLETS BY MOUTH NIGHTLY 21  Yes Herman Duran MD   DULoxetine (CYMBALTA) 60 MG extended release capsule Take 1 capsule by mouth daily 20  Yes Herman Duran MD   levonorgestrel-ethinyl estradiol (INTROVALE) 0.15-0.03 MG per tablet Take 1 tablet by mouth daily   Yes Historical Provider, MD   busPIRone (BUSPAR) 10 MG tablet TAKE 1 TABLET BY MOUTH THREE TIMES A DAY  Patient taking differently: Take 10 mg by mouth 3 times daily as needed (anxiety) TAKE 1 TABLET BY MOUTH THREE TIMES A DAY 21   Herman Duran MD   folic acid (FOLVITE) 1 MG tablet TAKE 1 TABLET BY MOUTH EVERY DAY 20   Herman Duran MD   SUMAtriptan (IMITREX) 100 MG tablet Take one tablet for a severe headache/migraine. May repeat one tablet in 2 hours if needed one time. Do not use more than 2 tablets in 24 hours. Do not use more than 8 tablets in one month.  19   Herman Duran MD       Current medications:    Current Facility-Administered Medications   Medication Dose Route Frequency Provider Last Rate Last Admin    ceFAZolin (ANCEF) 2000 mg in sterile water 20 mL IV syringe  2,000 mg Intravenous Once Edelmira Abdullahi DPM        lactated ringers infusion   Intravenous Continuous Erasmo Sal MD        sodium chloride flush 0.9 % injection 10 mL  10 mL Intravenous 2 times per day Esmer Martinez MD        0.9 % sodium chloride infusion  25 mL Intravenous PRN Esmer Martinez MD        famotidine (PEPCID) injection 20 mg  20 mg Intravenous Once Esmer Martinez MD        sodium chloride flush 0.9 % injection 10 mL  10 mL Intravenous 2 times per day Esmer Martinez MD        meperidine (DEMEROL) injection SOLN 12.5 mg  12.5 mg Intravenous Q5 Min PRN Darlene Palmer MD        HYDROmorphone (DILAUDID) injection 0.5 mg  0.5 mg Intravenous Q10 Min PRN Darlene Palmer MD        HYDROmorphone (DILAUDID) injection 0.5 mg  0.5 mg Intravenous Q5 Min PRN Darlene Palmer MD        oxyCODONE-acetaminophen (PERCOCET) 5-325 MG per tablet 1 tablet  1 tablet Oral PRN Darlene Palmer MD        Or    oxyCODONE-acetaminophen (PERCOCET) 5-325 MG per tablet 2 tablet  2 tablet Oral PRN Darlene Palmer MD        ondansetron [facility]) injection 4 mg  4 mg Intravenous Q30 Min PRN Darlene Palmer MD        diphenhydrAMINE (BENADRYL) injection 6.25 mg  6.25 mg Intravenous Once PRN Darlene Palmer MD        labetalol (NORMODYNE;TRANDATE) injection 5 mg  5 mg Intravenous Q15 Min PRN Darlene Palmer MD        hydrALAZINE (APRESOLINE) injection 5 mg  5 mg Intravenous Q30 Min PRN Darlene Palmer MD        lidocaine PF 1 % injection                Allergies:     Allergies   Allergen Reactions    Percocet [Oxycodone-Acetaminophen] Itching    Amoxicillin-Pot Clavulanate Nausea And Vomiting       Problem List:    Patient Active Problem List   Diagnosis Code    Migraine without aura and without status migrainosus, not intractable G43.009    Arnold-Chiari malformation, type I ([facility]) G93.5    Anxiety F41.9    Nocturia R35.1       Past Medical History:        Diagnosis Date    Anxiety     Arnold-Chiari malformation, type I ([facility] [address] 12/11/2017    Arnold-Chiari malformation, type II ([facility] [address] 12/11/2017    Chiari I malformation ([facility] [address]     Concussion 11/2013    Depression     Migraine without aura and without status migrainosus, not intractable 12/11/2017    PONV (postoperative nausea and vomiting)        Past Surgical History:        Procedure Laterality Date    PLANTAR FASCIA SURGERY Left 4/28/2021    ENDOSCOPIC PLANTAR FASCIOTOMY LEFT FOOT performed by Jeremiah Rene DPM at [facility]         Social History:    Social History     Tobacco Use    Smoking status: Never Smoker    Smokeless tobacco: Never Used   Substance Use Topics    Alcohol use: No                                Counseling given: Not Answered      Vital Signs (Current):   Vitals:    05/12/21 0822   BP: 123/83   Pulse: 94   Resp: 14   Temp: 97.6 °F (36.4 °C)   TempSrc: Temporal   SpO2: 100%   Weight: 155 lb (70.3 kg)   Height: 5' 2\" (1.575 m)                                              BP Readings from Last 3 Encounters:   05/12/21 123/83   04/28/21 (!) 94/57   04/28/21 126/84       NPO Status:                                                                                 BMI:   Wt Readings from Last 3 Encounters:   05/12/21 155 lb (70.3 kg)   04/22/21 155 lb (70.3 kg)   04/27/21 156 lb (70.8 kg)     Body mass index is 28.35 kg/m².     CBC:   Lab Results   Component Value Date    WBC 8.6 05/05/2020    RBC 5.21 05/05/2020    HGB 15.0 05/05/2020    HCT 44.9 05/05/2020    MCV 86.1 05/05/2020    RDW 12.9 05/05/2020     05/05/2020       CMP:   Lab Results   Component Value Date     05/05/2020    K 4.1 05/05/2020     05/05/2020    CO2 24 05/05/2020    BUN 15 05/05/2020    CREATININE 0.8 05/05/2020    GFRAA >60 05/05/2020    AGRATIO 1.4 05/05/2020    LABGLOM >60 05/05/2020    GLUCOSE 75 05/05/2020    PROT 7.0 05/05/2020    CALCIUM 9.0 05/05/2020    BILITOT 0.4 05/05/2020    ALKPHOS 79 05/05/2020    AST 19 05/05/2020    ALT 28 05/05/2020       POC Tests: No results for input(s): POCGLU, POCNA, POCK, POCCL, POCBUN,

## 2021-05-12 NOTE — ANESTHESIA POSTPROCEDURE EVALUATION
Department of Anesthesiology  Postprocedure Note    Patient: Ana Denver  MRN: [id number]  YOB: 1995  Date of evaluation: 5/12/2021  Time:  11:27 AM     Procedure Summary     Date: 05/12/21 Room / Location: [facility] EG OR 04 / [facility]    Anesthesia Start: 1025 Anesthesia Stop: 8639    Procedure: ENDOSCOPIC PLANTAR FASCIOTOMY RIGHT FOOT (Right Foot) Diagnosis: (CHRONIC PLANTAR FASCIITIS RIGHT FOOT)    Surgeons: Ana White DPM Responsible Provider: Eva Atkins MD    Anesthesia Type: general ASA Status: 2          Anesthesia Type: general    Noelle Phase I: Noelle Score: 9    Noelle Phase II:      Last vitals: Reviewed and per EMR flowsheets.        Anesthesia Post Evaluation    Comments: Postoperative Anesthesia Note    Name:    Ana Denver  MRN:      [id number]    Patient Vitals in the past 12 hrs:  05/12/21 1110, BP:117/79, Pulse:84, Resp:16, SpO2:100 %  05/12/21 1105, BP:117/72, Pulse:85, Resp:14, SpO2:100 %  05/12/21 1100, BP:119/72, Pulse:102, Resp:14, SpO2:100 %  05/12/21 1055, BP:120/76, Temp:97.4 °F (36.3 °C), Temp src:Temporal, Pulse:107, Resp:15, SpO2:100 %  05/12/21 0822, BP:123/83, Temp:97.6 °F (36.4 °C), Temp src:Temporal, Pulse:94, Resp:14, SpO2:100 %, Height:5' 2\" (1.575 m), Weight:155 lb (70.3 kg)     LABS:    CBC  Lab Results       Component                Value               Date/Time                  WBC                      8.6                 05/05/2020 06:58 AM        HGB                      15.0                05/05/2020 06:58 AM        HCT                      44.9                05/05/2020 06:58 AM        PLT                      272                 05/05/2020 06:58 AM   RENAL  Lab Results       Component                Value               Date/Time                  NA                       144                 05/05/2020 06:58 AM        K                        4.1                 05/05/2020 06:58 AM        CL                       108 05/05/2020 06:58 AM        CO2                      24                  05/05/2020 06:58 AM        BUN                      15                  05/05/2020 06:58 AM        CREATININE               0.8                 05/05/2020 06:58 AM        GLUCOSE                  75                  05/05/2020 06:58 AM   COAGS  No results found for: PROTIME, INR, APTT    Intake & Output: In: 800 (I.V.:800)  Out: -     Nausea & Vomiting:  No    Level of Consciousness:  Awake    Pain Assessment:  Adequate analgesia    Anesthesia Complications:  No apparent anesthetic complications    SUMMARY      Vital signs stable  OK to discharge from Stage I post anesthesia care.   Care transferred from Anesthesiology department on discharge from perioperative area

## 2021-05-24 RX ORDER — DULOXETIN HYDROCHLORIDE 60 MG/1
CAPSULE, DELAYED RELEASE ORAL
Qty: 90 CAPSULE | Refills: 0 | Status: SHIPPED | OUTPATIENT
Start: 2021-05-24 | End: 2021-08-24

## 2021-05-27 RX ORDER — BUPROPION HYDROCHLORIDE 150 MG/1
TABLET ORAL
Qty: 90 TABLET | Refills: 0 | Status: SHIPPED | OUTPATIENT
Start: 2021-05-27 | End: 2021-09-13

## 2021-06-11 ENCOUNTER — TELEPHONE (OUTPATIENT)
Dept: INTERNAL MEDICINE CLINIC | Age: 26
End: 2021-06-11

## 2021-06-11 RX ORDER — METHYLPREDNISOLONE 4 MG/1
TABLET ORAL
Qty: 1 KIT | Refills: 0 | Status: SHIPPED | OUTPATIENT
Start: 2021-06-11 | End: 2021-06-17

## 2021-06-14 ENCOUNTER — TELEPHONE (OUTPATIENT)
Dept: INTERNAL MEDICINE CLINIC | Age: 26
End: 2021-06-14

## 2021-06-14 RX ORDER — PREDNISONE 20 MG/1
40 TABLET ORAL DAILY
Qty: 10 TABLET | Refills: 0 | Status: SHIPPED | OUTPATIENT
Start: 2021-06-14 | End: 2021-06-19

## 2021-06-14 NOTE — TELEPHONE ENCOUNTER
----- Message from Nav Flores MD sent at 6/14/2021  4:39 PM EDT -----  Contact: Eryn Deleon  [phone]  Not enough  Repeat steroids with prednisone 40 mg daily x 5 days  Stop medrol  ----- Message -----  From: Miki Garcia  Sent: 6/14/2021   1:22 PM EDT  To: Nav Flores MD    She was prescribed medrol dose pack on 6/11  ----- Message -----  From: Nav Flores MD  Sent: 6/14/2021  12:59 PM EDT  To: Lizzy Muhammad    How much prednisone  ----- Message -----  From: Makeda Bobby  Sent: 6/14/2021  12:21 PM EDT  To: Nav Flores MD    Patient called to say the rash she has is spreading. Was prescribed Prednisolone    Seemed to be helping after a couple days, not working now. Also, using Ivy rest cream.      May need something else prescribed.     BLM

## 2021-06-17 ENCOUNTER — TELEPHONE (OUTPATIENT)
Dept: INTERNAL MEDICINE CLINIC | Age: 26
End: 2021-06-17

## 2021-06-17 NOTE — TELEPHONE ENCOUNTER
----- Message from Amina Hawthorne MD sent at 6/17/2021  2:29 PM EDT -----  Contact: Cornelia Romero  [phone]  Tomorrow 845 am  ----- Message -----  From: Tam Balbuena MA  Sent: 6/17/2021  12:43 PM EDT  To: MD Dr. Nadine Mirza patient    Patient called stating she is on her  second steroid pack and still feels terrible and rash is still spreading. Patient said it has spread to her stomach and thighs. Patient is asking if she can please get in this week to be seen.  Thank you

## 2021-06-17 NOTE — TELEPHONE ENCOUNTER
----- Message from Diego Kearney MD sent at 6/17/2021  2:29 PM EDT -----  Contact: Tanvir Villagomez  [phone]  Tomorrow 845 am  ----- Message -----  From: Jus To MA  Sent: 6/17/2021  12:43 PM EDT  To: MD Dr. Jim Gregorio Dies patient    Patient called stating she is on her  second steroid pack and still feels terrible and rash is still spreading. Patient said it has spread to her stomach and thighs. Patient is asking if she can please get in this week to be seen.  Thank you

## 2021-06-21 ENCOUNTER — OFFICE VISIT (OUTPATIENT)
Dept: INTERNAL MEDICINE CLINIC | Age: 26
End: 2021-06-21

## 2021-06-21 VITALS
SYSTOLIC BLOOD PRESSURE: 120 MMHG | WEIGHT: 164 LBS | DIASTOLIC BLOOD PRESSURE: 86 MMHG | HEIGHT: 62 IN | BODY MASS INDEX: 30.18 KG/M2

## 2021-06-21 DIAGNOSIS — G43.009 MIGRAINE WITHOUT AURA AND WITHOUT STATUS MIGRAINOSUS, NOT INTRACTABLE: Primary | ICD-10-CM

## 2021-06-21 DIAGNOSIS — G93.5 ARNOLD-CHIARI MALFORMATION, TYPE I (HCC): ICD-10-CM

## 2021-06-21 DIAGNOSIS — R00.0 TACHYCARDIA: ICD-10-CM

## 2021-06-21 DIAGNOSIS — F41.9 ANXIETY: ICD-10-CM

## 2021-06-21 PROCEDURE — 93000 ELECTROCARDIOGRAM COMPLETE: CPT | Performed by: INTERNAL MEDICINE

## 2021-06-21 PROCEDURE — 99214 OFFICE O/P EST MOD 30 MIN: CPT | Performed by: INTERNAL MEDICINE

## 2021-06-21 RX ORDER — TOPIRAMATE 100 MG/1
TABLET, FILM COATED ORAL
Qty: 45 TABLET | Refills: 0 | Status: SHIPPED | OUTPATIENT
Start: 2021-06-21 | End: 2021-07-15

## 2021-06-21 RX ORDER — FOLIC ACID 1 MG/1
TABLET ORAL
Qty: 90 TABLET | Refills: 0 | Status: SHIPPED | OUTPATIENT
Start: 2021-06-21 | End: 2022-03-15 | Stop reason: ALTCHOICE

## 2021-06-21 ASSESSMENT — ENCOUNTER SYMPTOMS
ABDOMINAL PAIN: 0
SHORTNESS OF BREATH: 0
RHINORRHEA: 0
VOMITING: 0
WHEEZING: 0
NAUSEA: 0

## 2021-06-21 NOTE — PROGRESS NOTES
Subjective:      Patient ID: Flora Cotto is a 23 y.o. female. HPI     Patient is here for follow up on her migraines and anxiety. Her migraines are very well controlled. Her Topamax was increased to 100 mg and she says her migraines are great. She has not taken any Imitrex. Her anxiety is controlled on Cymbalta and Wellbutrin. She had plantar fascia surgery. Her HR was elevated during a recent pre op. She has history of Arnold Chiari Malformation type 1. Her allergies are controlled. She has had rash in her thigh. She has had multiple doses of steroids and they have not helped. It it itching. No fever or chills. No new medications. Review of Systems   Constitutional: Negative for appetite change and fatigue. HENT: Negative for postnasal drip and rhinorrhea. Respiratory: Negative for shortness of breath and wheezing. Cardiovascular: Negative for chest pain and palpitations. Gastrointestinal: Negative for abdominal pain, nausea and vomiting. Musculoskeletal: Negative for joint swelling. Skin: Positive for rash. Neurological: Negative for light-headedness. Psychiatric/Behavioral: Negative for sleep disturbance. There are no changes to past medical history, family history, social history or review of systems(except as noted in the history section) since prior note (all reviewed with patient). Current Outpatient Medications:     topiramate (TOPAMAX) 100 MG tablet, TAKE 1 AND 1/2 TABLETS BY MOUTH NIGHTLY, Disp: 45 tablet, Rfl: 0    folic acid (FOLVITE) 1 MG tablet, TAKE 1 TABLET BY MOUTH EVERY DAY, Disp: 90 tablet, Rfl: 0    buPROPion (WELLBUTRIN XL) 150 MG extended release tablet, TAKE 1 TABLET BY MOUTH EVERY DAY, Disp: 90 tablet, Rfl: 0    DULoxetine (CYMBALTA) 60 MG extended release capsule, TAKE 1 CAPSULE BY MOUTH EVERY DAY, Disp: 90 capsule, Rfl: 0    SUMAtriptan (IMITREX) 100 MG tablet, Take one tablet for a severe headache/migraine.  May repeat one tablet in 2 hours if needed one time. Do not use more than 2 tablets in 24 hours. Do not use more than 8 tablets in one month., Disp: 9 tablet, Rfl: 2    levonorgestrel-ethinyl estradiol (INTROVALE) 0.15-0.03 MG per tablet, Take 1 tablet by mouth daily, Disp: , Rfl:     /86 (Site: Right Upper Arm, Position: Sitting, Cuff Size: Medium Adult)   Ht 5' 2\" (1.575 m)   Wt 164 lb (74.4 kg)   BMI 30.00 kg/m²     Objective:   Physical Exam  Constitutional:       Appearance: She is well-developed. HENT:      Head: Normocephalic. Eyes:      Conjunctiva/sclera: Conjunctivae normal.      Pupils: Pupils are equal, round, and reactive to light. Neck:      Thyroid: No thyroid mass or thyromegaly. Vascular: No carotid bruit or JVD. Trachea: Trachea normal.   Cardiovascular:      Rate and Rhythm: Normal rate and regular rhythm. Heart sounds: Normal heart sounds. No murmur heard. No gallop. Pulmonary:      Effort: Pulmonary effort is normal. No respiratory distress. Breath sounds: Normal breath sounds. No wheezing or rales. Abdominal:      General: Bowel sounds are normal. There is no distension. Palpations: Abdomen is soft. There is no hepatomegaly, splenomegaly or mass. Tenderness: There is no abdominal tenderness. Musculoskeletal:         General: Normal range of motion. Cervical back: Normal range of motion and neck supple. Lymphadenopathy:      Cervical: No cervical adenopathy. Skin:     General: Skin is warm and dry. Findings: Rash present. Neurological:      Mental Status: She is alert and oriented to person, place, and time. Cranial Nerves: No cranial nerve deficit. Deep Tendon Reflexes: Reflexes are normal and symmetric. Psychiatric:         Behavior: Behavior normal.         Thought Content: Thought content normal.         Judgment: Judgment normal.         Assessment:       Diagnosis Orders   1.  Migraine without aura and without status migrainosus, not intractable     2. Arnold-Chiari malformation, type I ([facility] [address]     3. Anxiety     4. Tachycardia  EKG 12 Lead    TSH WITH REFLEX TO FT4          Plan:      #  Migraines controlled. #  Anxiety on Cymbalta. #  Arnold Chiari malformation stable. #  Rash refer to dermatology. #  Tachycardia check ECG.                 Ayden Hawkins MD

## 2021-06-25 DIAGNOSIS — I49.3 PVC (PREMATURE VENTRICULAR CONTRACTION): ICD-10-CM

## 2021-06-25 DIAGNOSIS — R00.0 SINUS TACHYCARDIA: Primary | ICD-10-CM

## 2021-07-08 ENCOUNTER — HOSPITAL ENCOUNTER (OUTPATIENT)
Dept: NON INVASIVE DIAGNOSTICS | Age: 26
Discharge: HOME | End: 2021-07-08
Payer: COMMERCIAL

## 2021-07-08 DIAGNOSIS — R00.0 SINUS TACHYCARDIA: ICD-10-CM

## 2021-07-08 DIAGNOSIS — I49.3 PVC (PREMATURE VENTRICULAR CONTRACTION): ICD-10-CM

## 2021-07-08 LAB
LV EF: 55 %
LVEF MODALITY: NORMAL

## 2021-07-08 PROCEDURE — 93306 TTE W/DOPPLER COMPLETE: CPT

## 2021-07-15 RX ORDER — TOPIRAMATE 100 MG/1
TABLET, FILM COATED ORAL
Qty: 45 TABLET | Refills: 0 | Status: SHIPPED | OUTPATIENT
Start: 2021-07-15 | End: 2021-08-16

## 2021-08-04 NOTE — PROGRESS NOTES
[facility]   Cardiac Consultation    Referring Provider:  Tristan Coley MD     Chief Complaint   Patient presents with    New Patient    Irregular Heart Beat        History of Present Illness:  Antonio Gallardo 1995 is self referred as as new patient today for tachycardia and c/o palpitations. She has PMH of anxiety, depression, migraine HA, and Arnold-Chiari malformation. No cardiac history prior. Most recent EKG 6/21/21 NSR; short [place]; RSR(V1): 85 bpm.  Most recent ECHO 7/8/2021 showed an EF of 55% with normal wall motion; trivial MR/TR. 24 hour monitor 6/24/2021 showed NSR with brief sinus tach; average  bpm (max 162bpm); rare PVC; NO afib/PSVT/Vtach. Today, she reports that her heart races occasionally. She feels palpitations. She gets sweaty, dizzy and mildly short of breath sometimes. This has been going on for about 3 months. They happened daily. They are fleeting. She feels better after sitting and resting. Patient with no complaints of chest pain, SOB, edema, or orthopnea/PND. She is active in daily life and denies any exertional SOB or CP. She has cut out caffeine but only drank 1-2 cups prior. Past Medical History:   has a past medical history of Anxiety, Arnold-Chiari malformation, type I ([address] Arnold-Chiari malformation, type II ([address] Chiari I malformation ([address] Concussion, Depression, Migraine without aura and without status migrainosus, not intractable, and PONV (postoperative nausea and vomiting). Surgical History:   has a past surgical history that includes shoulder surgery; Plantar fascia surgery (Left, 4/28/2021); Plantar fascia surgery (Right, 05/12/2021); and Plantar fascia surgery (Right, 5/12/2021). Social History:   reports that she has never smoked. She has never used smokeless tobacco. She reports that she does not drink alcohol and does not use drugs. She is engaged. She is attending nursing school.   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 Family History:  family history includes Allergy (Severe) in her brother; Diabetes in her maternal cousin; Heart Disease in her maternal grandfather, maternal grandmother, paternal grandfather, and paternal grandmother; High Blood Pressure in her father and mother. Home Medications:  Prior to Admission medications    Medication Sig Start Date End Date Taking? Authorizing Provider   topiramate (TOPAMAX) 100 MG tablet TAKE 1 AND 1/2 TABLETS BY MOUTH NIGHTLY 7/15/21  Yes Yakov Whipple MD   folic acid (FOLVITE) 1 MG tablet TAKE 1 TABLET BY MOUTH EVERY DAY 6/21/21  Yes Yakov Whipple MD   buPROPion (WELLBUTRIN XL) 150 MG extended release tablet TAKE 1 TABLET BY MOUTH EVERY DAY 5/27/21  Yes Yakov Whipple MD   DULoxetine (CYMBALTA) 60 MG extended release capsule TAKE 1 CAPSULE BY MOUTH EVERY DAY 5/24/21  Yes Yakov Whipple MD   SUMAtriptan (IMITREX) 100 MG tablet Take one tablet for a severe headache/migraine. May repeat one tablet in 2 hours if needed one time. Do not use more than 2 tablets in 24 hours. Do not use more than 8 tablets in one month. 9/16/19  Yes Yakov Whipple MD   levonorgestrel-ethinyl estradiol (INTROVALE) 0.15-0.03 MG per tablet Take 1 tablet by mouth daily   Yes Historical Provider, MD        Allergies:  Percocet [oxycodone-acetaminophen] and Amoxicillin-pot clavulanate     Review of Systems:   · Constitutional: there has been no unanticipated weight loss. There's been no change in energy level, sleep pattern, or activity level. · Eyes: No visual changes or diplopia. No scleral icterus. · ENT: No Headaches, hearing loss or vertigo. No mouth sores or sore throat. · Cardiovascular: Reviewed in HPI  · Respiratory: No cough or wheezing, no sputum production. No hematemesis. · Gastrointestinal: No abdominal pain, appetite loss, blood in stools. No change in bowel or bladder habits.   · Genitourinary: No dysuria, trouble voiding, or hematuria. · Musculoskeletal:  No gait disturbance, weakness or joint complaints. · Integumentary: No rash or pruritis. · Neurological: No headache, diplopia, change in muscle strength, numbness or tingling. No change in gait, balance, coordination, mood, affect, memory, mentation, behavior. · Psychiatric: No anxiety, no depression. · Endocrine: No malaise, fatigue or temperature intolerance. No excessive thirst, fluid intake, or urination. No tremor. · Hematologic/Lymphatic: No abnormal bruising or bleeding, blood clots or swollen lymph nodes. · Allergic/Immunologic: No nasal congestion or hives. Physical Examination:    Vitals:    08/05/21 0737   BP: 110/60   Pulse: 99   SpO2: 99%        Constitutional and General Appearance: NAD   Respiratory:  · Normal excursion and expansion without use of accessory muscles  · Resp Auscultation: Normal breath sounds without dullness, clear, no crackles or wheezes  Cardiovascular:  · The apical impulses not displaced  · Heart tones are crisp and normal  · Cervical veins are not engorged  · The carotid upstroke is normal in amplitude and contour without delay or bruit  · Normal S1S2, No S3, No Murmur  · Peripheral pulses are symmetrical and full  · There is no clubbing, cyanosis of the extremities. · No edema  · Femoral Arteries: 2+ and equal  · Pedal Pulses: 2+ and equal   Abdomen:  · No masses or tenderness  · Liver/Spleen: No Abnormalities Noted  Neurological/Psychiatric:  · Alert and oriented in all spheres  · Moves all extremities well  · Exhibits normal gait balance and coordination  · No abnormalities of mood, affect, memory, mentation, or behavior are noted  Skin:  · Skin: warm and dry. Assessment:     1. Tachycardia: Sinus tachycardia briefly on monitor which is mildly symptomatic. No congenital or structural heart issue and no concerning tachyarrhythmia based on recent cardiac testing.  Most recent EKG 6/21/21 NSR; short RI; RSR(V1): 85 bpm.  Most recent ECHO 7/8/2021 showed an EF of 55% with normal wall motion; trivial MR/TR. 24 hour monitor 6/24/2021 showed NSR with brief sinus tach; average  bpm (max 162bpm); rare PVC; NO afib/PSVT/Vtach. Plan:  1. Rock Jazmin MD recheck TSH and CBC and electrolytes. 2. Discussed nadolol and referring you to EP. She feels better after encouragement that ECHO normal and no concerning tachyarrhythmia. Sometimes we start beta blocker to help symptoms of sinus tach but she declines any meds or EP evaluation. Will follow only clinically. 3. Follow up as needed    This note was scribed in the presence of Star Mejia MD by Lashaun Herman RN. I, Dr. Star Mejia, personally performed the services described in this documentation, as scribed by the above signed scribe in my presence. It is both accurate and complete to my knowledge. I agree with the details independently gathered by the clinical support staff, while the remaining scribed note accurately describes my personal service to the patient. Thank you for allowing me to participate in the care of this individual.    Leann Alcaraz.  Sarah Watt M.D., [facility] - [place]

## 2021-08-05 ENCOUNTER — OFFICE VISIT (OUTPATIENT)
Dept: CARDIOLOGY CLINIC | Age: 26
End: 2021-08-05
Payer: COMMERCIAL

## 2021-08-05 VITALS
OXYGEN SATURATION: 99 % | HEART RATE: 99 BPM | SYSTOLIC BLOOD PRESSURE: 110 MMHG | WEIGHT: 165.5 LBS | HEIGHT: 62 IN | BODY MASS INDEX: 30.46 KG/M2 | DIASTOLIC BLOOD PRESSURE: 60 MMHG

## 2021-08-05 DIAGNOSIS — R00.0 TACHYCARDIA: ICD-10-CM

## 2021-08-05 PROCEDURE — 99203 OFFICE O/P NEW LOW 30 MIN: CPT | Performed by: INTERNAL MEDICINE

## 2021-08-05 NOTE — LETTER
[facility], [place], operated by [facility]   Cardiac Consultation    Referring Provider:  Betina Manuel MD     Chief Complaint   Patient presents with    New Patient    Irregular Heart Beat        History of Present Illness:  García Messina 1995 is self referred as as new patient today for tachycardia and c/o palpitations. She has PMH of anxiety, depression, migraine HA, and Arnold-Chiari malformation. No cardiac history prior. Most recent EKG 6/21/21 NSR; short [place]; RSR(V1): 85 bpm.  Most recent ECHO 7/8/2021 showed an EF of 55% with normal wall motion; trivial MR/TR. 24 hour monitor 6/24/2021 showed NSR with brief sinus tach; average  bpm (max 162bpm); rare PVC; NO afib/PSVT/Vtach. Today, she reports that her heart races occasionally. She feels palpitations. She gets sweaty, dizzy and mildly short of breath sometimes. This has been going on for about 3 months. They happened daily. They are fleeting. She feels better after sitting and resting. Patient with no complaints of chest pain, SOB, edema, or orthopnea/PND. She is active in daily life and denies any exertional SOB or CP. She has cut out caffeine but only drank 1-2 cups prior. Past Medical History:   has a past medical history of Anxiety, Arnold-Chiari malformation, type I ([address] Arnold-Chiari malformation, type II ([address] Chiari I malformation ([address] Concussion, Depression, Migraine without aura and without status migrainosus, not intractable, and PONV (postoperative nausea and vomiting). Surgical History:   has a past surgical history that includes shoulder surgery; Plantar fascia surgery (Left, 4/28/2021); Plantar fascia surgery (Right, 05/12/2021); and Plantar fascia surgery (Right, 5/12/2021). Social History:   reports that she has never smoked. She has never used smokeless tobacco. She reports that she does not drink alcohol and does not use drugs. She is engaged. She is attending nursing school.   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 Family History:  family history includes Allergy (Severe) in her brother; Diabetes in her maternal cousin; Heart Disease in her maternal grandfather, maternal grandmother, paternal grandfather, and paternal grandmother; High Blood Pressure in her father and mother. Home Medications:  Prior to Admission medications    Medication Sig Start Date End Date Taking? Authorizing Provider   topiramate (TOPAMAX) 100 MG tablet TAKE 1 AND 1/2 TABLETS BY MOUTH NIGHTLY 7/15/21  Yes Herve Saucedo MD   folic acid (FOLVITE) 1 MG tablet TAKE 1 TABLET BY MOUTH EVERY DAY 6/21/21  Yes Herve Saucedo MD   buPROPion (WELLBUTRIN XL) 150 MG extended release tablet TAKE 1 TABLET BY MOUTH EVERY DAY 5/27/21  Yes Herve Saucedo MD   DULoxetine (CYMBALTA) 60 MG extended release capsule TAKE 1 CAPSULE BY MOUTH EVERY DAY 5/24/21  Yes Herve Saucedo MD   SUMAtriptan (IMITREX) 100 MG tablet Take one tablet for a severe headache/migraine. May repeat one tablet in 2 hours if needed one time. Do not use more than 2 tablets in 24 hours. Do not use more than 8 tablets in one month. 9/16/19  Yes Herve Saucedo MD   levonorgestrel-ethinyl estradiol (INTROVALE) 0.15-0.03 MG per tablet Take 1 tablet by mouth daily   Yes Historical Provider, MD        Allergies:  Percocet [oxycodone-acetaminophen] and Amoxicillin-pot clavulanate     Review of Systems:   · Constitutional: there has been no unanticipated weight loss. There's been no change in energy level, sleep pattern, or activity level. · Eyes: No visual changes or diplopia. No scleral icterus. · ENT: No Headaches, hearing loss or vertigo. No mouth sores or sore throat. · Cardiovascular: Reviewed in HPI  · Respiratory: No cough or wheezing, no sputum production. No hematemesis. · Gastrointestinal: No abdominal pain, appetite loss, blood in stools. No change in bowel or bladder habits.   · Genitourinary: No dysuria, trouble voiding, recent ECHO 7/8/2021 showed an EF of 55% with normal wall motion; trivial MR/TR. 24 hour monitor 6/24/2021 showed NSR with brief sinus tach; average  bpm (max 162bpm); rare PVC; NO afib/PSVT/Vtach. Plan:  1. Niko Riggs MD recheck TSH and CBC and electrolytes. 2. Discussed nadolol and referring you to EP. She feels better after encouragement that ECHO normal and no concerning tachyarrhythmia. Sometimes we start beta blocker to help symptoms of sinus tach but she declines any meds or EP evaluation. Will follow only clinically. 3. Follow up as needed    This note was scribed in the presence of Hieu Koch MD by Mick Duane RN. I, Dr. Hieu Koch, personally performed the services described in this documentation, as scribed by the above signed scribe in my presence. It is both accurate and complete to my knowledge. I agree with the details independently gathered by the clinical support staff, while the remaining scribed note accurately describes my personal service to the patient. Thank you for allowing me to participate in the care of this individual.    Forrest Dubin.  Brayden Dia M.D., [facility] - [place]

## 2021-08-05 NOTE — PATIENT INSTRUCTIONS
Plan:  1. Lura Leyden, MD recheck TSH and CBC  2. Discussed nadolol and referring you to EP. Patient declined both at this time  3.  Follow up as needed

## 2021-08-16 RX ORDER — TOPIRAMATE 100 MG/1
TABLET, FILM COATED ORAL
Qty: 45 TABLET | Refills: 0 | Status: SHIPPED | OUTPATIENT
Start: 2021-08-16 | End: 2021-08-31 | Stop reason: ALTCHOICE

## 2021-08-30 ENCOUNTER — PATIENT MESSAGE (OUTPATIENT)
Dept: INTERNAL MEDICINE CLINIC | Age: 26
End: 2021-08-30

## 2021-08-30 NOTE — TELEPHONE ENCOUNTER
From: Jonnie Davison  To: Carmelita Garcia MD  Sent: 8/30/2021 3:36 PM EDT  Subject: Visit Follow-Up Question    Hello,  I would like to schedule an appointment with Dr. Steve Hadley or an NP to discuss alternate headache medications other than the Topiramate that I am taking. I am currently seeing a psychiatrist and counselor in which believe Topamax could be a cause to my moods/depression. Thank you.     Trudi Lynne

## 2021-08-31 RX ORDER — DIVALPROEX SODIUM 250 MG/1
250 TABLET, DELAYED RELEASE ORAL 2 TIMES DAILY
Qty: 60 TABLET | Refills: 0 | Status: SHIPPED | OUTPATIENT
Start: 2021-08-31 | End: 2021-09-23 | Stop reason: SDUPTHER

## 2021-08-31 NOTE — TELEPHONE ENCOUNTER
Patient spoke to her psychiatrist about starting inderal.  Psychiatrist did not want her to start inderal since she will be starting on prozac. Psychiatrist would be ok with Depakote. Per Dr Delaney Mccracken patient being prescribed Depakote 250 mg twice daily. Per Dr Delaney Mccracken patient informed to taper her topamax by 50 mg each week. Patient voiced understanding.

## 2021-09-13 RX ORDER — BUPROPION HYDROCHLORIDE 150 MG/1
TABLET ORAL
Qty: 30 TABLET | Refills: 0 | Status: SHIPPED | OUTPATIENT
Start: 2021-09-13 | End: 2022-03-15 | Stop reason: ALTCHOICE

## 2021-09-15 RX ORDER — DULOXETIN HYDROCHLORIDE 60 MG/1
CAPSULE, DELAYED RELEASE ORAL
Qty: 30 CAPSULE | Refills: 0 | Status: SHIPPED | OUTPATIENT
Start: 2021-09-15 | End: 2022-03-15 | Stop reason: ALTCHOICE

## 2021-09-24 RX ORDER — DIVALPROEX SODIUM 250 MG/1
250 TABLET, DELAYED RELEASE ORAL 2 TIMES DAILY
Qty: 60 TABLET | Refills: 2 | Status: SHIPPED | OUTPATIENT
Start: 2021-09-24 | End: 2021-09-27 | Stop reason: SDUPTHER

## 2021-09-27 RX ORDER — DIVALPROEX SODIUM 250 MG/1
250 TABLET, DELAYED RELEASE ORAL 2 TIMES DAILY
Qty: 180 TABLET | Refills: 0 | Status: SHIPPED | OUTPATIENT
Start: 2021-09-27 | End: 2022-03-15 | Stop reason: ALTCHOICE

## 2021-12-07 ENCOUNTER — TELEPHONE (OUTPATIENT)
Dept: INTERNAL MEDICINE CLINIC | Age: 26
End: 2021-12-07

## 2021-12-07 DIAGNOSIS — Z00.00 ROUTINE GENERAL MEDICAL EXAMINATION AT A HEALTH CARE FACILITY: Primary | ICD-10-CM

## 2021-12-16 ENCOUNTER — HOSPITAL ENCOUNTER (OUTPATIENT)
Age: 26
Discharge: HOME | End: 2021-12-16
Payer: COMMERCIAL

## 2021-12-16 DIAGNOSIS — Z00.00 ROUTINE GENERAL MEDICAL EXAMINATION AT A HEALTH CARE FACILITY: ICD-10-CM

## 2021-12-16 LAB
A/G RATIO: 1.5 (ref 1.1–2.2)
ALBUMIN SERPL-MCNC: 4.3 G/DL (ref 3.4–5)
ALP BLD-CCNC: 81 U/L (ref 40–129)
ALT SERPL-CCNC: 70 U/L (ref 10–40)
ANION GAP SERPL CALCULATED.3IONS-SCNC: 5 MMOL/L (ref 3–16)
AST SERPL-CCNC: 34 U/L (ref 15–37)
BASOPHILS ABSOLUTE: 0.1 K/UL (ref 0–0.2)
BASOPHILS RELATIVE PERCENT: 0.8 %
BILIRUB SERPL-MCNC: 0.7 MG/DL (ref 0–1)
BUN BLDV-MCNC: 18 MG/DL (ref 7–20)
CALCIUM SERPL-MCNC: 9.5 MG/DL (ref 8.3–10.6)
CHLORIDE BLD-SCNC: 105 MMOL/L (ref 99–110)
CO2: 29 MMOL/L (ref 21–32)
CREAT SERPL-MCNC: 0.8 MG/DL (ref 0.6–1.1)
EOSINOPHILS ABSOLUTE: 0.1 K/UL (ref 0–0.6)
EOSINOPHILS RELATIVE PERCENT: 1.8 %
GFR AFRICAN AMERICAN: >60
GFR NON-AFRICAN AMERICAN: >60
GLUCOSE BLD-MCNC: 83 MG/DL (ref 70–99)
HCT VFR BLD CALC: 42 % (ref 36–48)
HEMOGLOBIN: 14.1 G/DL (ref 12–16)
LYMPHOCYTES ABSOLUTE: 2.3 K/UL (ref 1–5.1)
LYMPHOCYTES RELATIVE PERCENT: 29.4 %
MCH RBC QN AUTO: 28 PG (ref 26–34)
MCHC RBC AUTO-ENTMCNC: 33.6 G/DL (ref 31–36)
MCV RBC AUTO: 83.5 FL (ref 80–100)
MONOCYTES ABSOLUTE: 0.6 K/UL (ref 0–1.3)
MONOCYTES RELATIVE PERCENT: 7.6 %
NEUTROPHILS ABSOLUTE: 4.7 K/UL (ref 1.7–7.7)
NEUTROPHILS RELATIVE PERCENT: 60.4 %
PDW BLD-RTO: 12.7 % (ref 12.4–15.4)
PLATELET # BLD: 259 K/UL (ref 135–450)
PMV BLD AUTO: 7.2 FL (ref 5–10.5)
POTASSIUM SERPL-SCNC: 4.7 MMOL/L (ref 3.5–5.1)
RBC # BLD: 5.03 M/UL (ref 4–5.2)
SODIUM BLD-SCNC: 139 MMOL/L (ref 136–145)
TOTAL PROTEIN: 7.2 G/DL (ref 6.4–8.2)
WBC # BLD: 7.8 K/UL (ref 4–11)

## 2021-12-16 PROCEDURE — 80053 COMPREHEN METABOLIC PANEL: CPT

## 2021-12-16 PROCEDURE — 85025 COMPLETE CBC W/AUTO DIFF WBC: CPT

## 2021-12-16 PROCEDURE — 36415 COLL VENOUS BLD VENIPUNCTURE: CPT

## 2021-12-16 PROCEDURE — 84443 ASSAY THYROID STIM HORMONE: CPT

## 2021-12-16 PROCEDURE — 80061 LIPID PANEL: CPT

## 2021-12-17 ENCOUNTER — TELEPHONE (OUTPATIENT)
Dept: INTERNAL MEDICINE CLINIC | Age: 26
End: 2021-12-17

## 2021-12-17 LAB
CHOLESTEROL, TOTAL: 184 MG/DL (ref 0–199)
HDLC SERPL-MCNC: 59 MG/DL (ref 40–60)
LDL CHOLESTEROL CALCULATED: 114 MG/DL
TRIGL SERPL-MCNC: 53 MG/DL (ref 0–150)
TSH REFLEX: 1.88 UIU/ML (ref 0.27–4.2)
VLDLC SERPL CALC-MCNC: 11 MG/DL

## 2021-12-17 NOTE — TELEPHONE ENCOUNTER
----- Message from Kate Ramos sent at 12/17/2021  4:03 PM EST -----  Contact: Destin Keller   [phone]  Pt informed. Wants to know if anything needs to be done for the elevated liver enzyme? Also states she has gained about 10-15 lbs in the last 2 months and Dr. Rigoberto Bullock also mentioned that she is retaining fluid in her feet and she should get it checked out.  Please advise.  ----- Message -----  From: nAnie Mcdonald MD  Sent: 12/17/2021   3:44 PM EST  To: [facility] Amiott    All labs ok except mild elevation of one liver enzyme  ----- Message -----  From: Kevin Liriano  Sent: 12/17/2021   2:13 PM EST  To: Annie Mcdonald MD    Patient states that she would like to go over all her blood labs work that she has had done before the first of the year with the

## 2021-12-17 NOTE — TELEPHONE ENCOUNTER
Per Dr Olayinka Decker, patient informed that he will recheck in LFTs in 3 months and discuss possible water retaining at her appointment on 1/10/22

## 2022-01-26 ENCOUNTER — TELEPHONE (OUTPATIENT)
Dept: INTERNAL MEDICINE CLINIC | Age: 27
End: 2022-01-26

## 2022-01-26 RX ORDER — DOXYCYCLINE HYCLATE 100 MG
100 TABLET ORAL 2 TIMES DAILY
Qty: 14 TABLET | Refills: 0 | Status: SHIPPED | OUTPATIENT
Start: 2022-01-26 | End: 2022-02-02

## 2022-01-26 RX ORDER — ALBUTEROL SULFATE 90 UG/1
2 AEROSOL, METERED RESPIRATORY (INHALATION) 4 TIMES DAILY PRN
Qty: 18 G | Refills: 0 | Status: SHIPPED | OUTPATIENT
Start: 2022-01-26 | End: 2022-02-14

## 2022-01-26 NOTE — TELEPHONE ENCOUNTER
----- Message from Jihan Seo MD sent at 1/26/2022  1:05 PM EST -----  Contact: [phone] (M)  Doxy 100 mg po bid # 14  Proventil inhaler 2 puffs qid prn  ----- Message -----  From: Rojas Walls  Sent: 1/26/2022  12:31 PM EST  To: Jihan Seo MD    Pt wants to know if you could prescribe something for the chest tightness, feels she might need an antibiotic. Please advise.  ----- Message -----  From: Jihan Seo MD  Sent: 1/26/2022  11:27 AM EST  To: Rojas Walls    Monitor oxygen saturation. As long as is over 90% just take Tylenol or ibuprofen.    if oxygen saturation drops below 90% go to the ER  ----- Message -----  From: Teena Marroquin MA  Sent: 1/26/2022   8:43 AM EST  To: Jihan Seo MD    Pt tested positive for covid on sat she has a cough, fever, headache, sore throat weak and tired she is having chest tightness and sob I informed her to get a pulse ox she has been taking sudafed and nyquil would like to know if there is anything she can take or what she needs to do about the chest tightness and sob  Thank you   COVID-19, Lauren Fort, Primary or Immunocompromised, PF, 100mcg/0.5mL2/8/2021, 1/12/2021      [facility]/pharmacy #[id number] - [place] [phone]

## 2022-01-26 NOTE — TELEPHONE ENCOUNTER
----- Message from Cecilia Weston MD sent at 1/26/2022 11:27 AM EST -----  Contact: [phone] (M)  Monitor oxygen saturation. As long as is over 90% just take Tylenol or ibuprofen.    if oxygen saturation drops below 90% go to the ER  ----- Message -----  From: Shanda Carrasquillo MA  Sent: 1/26/2022   8:43 AM EST  To: Cecilia Weston MD    Pt tested positive for covid on sat she has a cough, fever, headache, sore throat weak and tired she is having chest tightness and sob I informed her to get a pulse ox she has been taking sudafed and nyquil would like to know if there is anything she can take or what she needs to do about the chest tightness and sob  Thank you   COVID-19, Shirley Mccartney, Primary or Immunocompromised, PF, 100mcg/0.5mL2/8/2021, 1/12/2021      [facility]/pharmacy #[id number] - [place] [phone]

## 2022-03-15 ENCOUNTER — PATIENT MESSAGE (OUTPATIENT)
Dept: INTERNAL MEDICINE CLINIC | Age: 27
End: 2022-03-15

## 2022-03-15 ENCOUNTER — OFFICE VISIT (OUTPATIENT)
Dept: INTERNAL MEDICINE CLINIC | Age: 27
End: 2022-03-15

## 2022-03-15 VITALS
RESPIRATION RATE: 12 BRPM | DIASTOLIC BLOOD PRESSURE: 80 MMHG | WEIGHT: 186 LBS | HEIGHT: 62 IN | HEART RATE: 70 BPM | SYSTOLIC BLOOD PRESSURE: 110 MMHG | BODY MASS INDEX: 34.23 KG/M2

## 2022-03-15 DIAGNOSIS — G43.009 MIGRAINE WITHOUT AURA AND WITHOUT STATUS MIGRAINOSUS, NOT INTRACTABLE: Primary | ICD-10-CM

## 2022-03-15 DIAGNOSIS — G93.5 ARNOLD-CHIARI MALFORMATION, TYPE I (HCC): ICD-10-CM

## 2022-03-15 DIAGNOSIS — R74.01 TRANSAMINITIS: ICD-10-CM

## 2022-03-15 DIAGNOSIS — E66.9 CLASS 1 OBESITY WITH BODY MASS INDEX (BMI) OF 34.0 TO 34.9 IN ADULT, UNSPECIFIED OBESITY TYPE, UNSPECIFIED WHETHER SERIOUS COMORBIDITY PRESENT: Primary | ICD-10-CM

## 2022-03-15 DIAGNOSIS — F41.9 ANXIETY: ICD-10-CM

## 2022-03-15 LAB
BASOPHILS ABSOLUTE: 0 K/UL (ref 0–0.2)
BASOPHILS RELATIVE PERCENT: 0.6 %
EOSINOPHILS ABSOLUTE: 0.2 K/UL (ref 0–0.6)
EOSINOPHILS RELATIVE PERCENT: 2.9 %
HCT VFR BLD CALC: 42.3 % (ref 36–48)
HEMOGLOBIN: 14.2 G/DL (ref 12–16)
LYMPHOCYTES ABSOLUTE: 1.5 K/UL (ref 1–5.1)
LYMPHOCYTES RELATIVE PERCENT: 27 %
MCH RBC QN AUTO: 28.2 PG (ref 26–34)
MCHC RBC AUTO-ENTMCNC: 33.6 G/DL (ref 31–36)
MCV RBC AUTO: 83.9 FL (ref 80–100)
MONOCYTES ABSOLUTE: 0.4 K/UL (ref 0–1.3)
MONOCYTES RELATIVE PERCENT: 8 %
NEUTROPHILS ABSOLUTE: 3.4 K/UL (ref 1.7–7.7)
NEUTROPHILS RELATIVE PERCENT: 61.5 %
PDW BLD-RTO: 13.3 % (ref 12.4–15.4)
PLATELET # BLD: 232 K/UL (ref 135–450)
PMV BLD AUTO: 8.4 FL (ref 5–10.5)
RBC # BLD: 5.05 M/UL (ref 4–5.2)
WBC # BLD: 5.6 K/UL (ref 4–11)

## 2022-03-15 PROCEDURE — 99214 OFFICE O/P EST MOD 30 MIN: CPT | Performed by: INTERNAL MEDICINE

## 2022-03-15 RX ORDER — HYDROXYZINE 50 MG/1
50 TABLET, FILM COATED ORAL 3 TIMES DAILY PRN
COMMUNITY
Start: 2022-02-16 | End: 2022-06-09 | Stop reason: SDUPTHER

## 2022-03-15 RX ORDER — SUMATRIPTAN 100 MG/1
TABLET, FILM COATED ORAL
Qty: 9 TABLET | Refills: 2 | Status: SHIPPED | OUTPATIENT
Start: 2022-03-15 | End: 2022-06-07 | Stop reason: SDUPTHER

## 2022-03-15 RX ORDER — FLUOXETINE HYDROCHLORIDE 20 MG/1
20 CAPSULE ORAL DAILY
COMMUNITY
Start: 2021-09-20 | End: 2022-05-12 | Stop reason: SDUPTHER

## 2022-03-15 ASSESSMENT — PATIENT HEALTH QUESTIONNAIRE - PHQ9
1. LITTLE INTEREST OR PLEASURE IN DOING THINGS: 0
2. FEELING DOWN, DEPRESSED OR HOPELESS: 0
SUM OF ALL RESPONSES TO PHQ QUESTIONS 1-9: 0
SUM OF ALL RESPONSES TO PHQ QUESTIONS 1-9: 0
SUM OF ALL RESPONSES TO PHQ9 QUESTIONS 1 & 2: 0
SUM OF ALL RESPONSES TO PHQ QUESTIONS 1-9: 0
SUM OF ALL RESPONSES TO PHQ QUESTIONS 1-9: 0

## 2022-03-15 ASSESSMENT — ENCOUNTER SYMPTOMS
VOMITING: 0
RHINORRHEA: 0
NAUSEA: 0
SHORTNESS OF BREATH: 0
ABDOMINAL PAIN: 0
WHEEZING: 0

## 2022-03-15 NOTE — PROGRESS NOTES
Subjective:      Patient ID: James Brooke is a 27 y.o. female. HPI     Patient is here for follow up on her migraines and anxiety. Her migraines are very well controlled. She is off Topamax. Her HA are doing well. She gets one during her period. Her anxiety is controlled on Prozac and Vistaril. She has history of Arnold Chiari Malformation type 1. Her allergies are controlled. The rash is her thigh has resolved. Review of Systems   Constitutional: Negative for appetite change and fatigue. HENT: Negative for postnasal drip and rhinorrhea. Respiratory: Negative for shortness of breath and wheezing. Cardiovascular: Negative for chest pain and palpitations. Gastrointestinal: Negative for abdominal pain, nausea and vomiting. Musculoskeletal: Negative for joint swelling. Skin: Negative for rash. Neurological: Negative for light-headedness. Psychiatric/Behavioral: Negative for sleep disturbance. There are no changes to past medical history, family history, social history or review of systems(except as noted in the history section) since prior note (all reviewed with patient). Current Outpatient Medications:     FLUoxetine (PROZAC) 20 MG capsule, Take 20 mg by mouth daily, Disp: , Rfl:     hydrOXYzine (ATARAX) 50 MG tablet, Take 50 mg by mouth 3 times daily as needed, Disp: , Rfl:     SUMAtriptan (IMITREX) 100 MG tablet, Take one tablet for a severe headache/migraine. May repeat one tablet in 2 hours if needed one time. Do not use more than 2 tablets in 24 hours. Do not use more than 8 tablets in one month., Disp: 9 tablet, Rfl: 2    /80 (Site: Left Upper Arm, Position: Sitting, Cuff Size: Medium Adult)   Pulse 70   Resp 12   Ht 5' 2\" (1.575 m)   Wt 186 lb (84.4 kg)   BMI 34.02 kg/m²     Objective:   Physical Exam  Constitutional:       Appearance: She is well-developed. HENT:      Head: Normocephalic.    Eyes:      Conjunctiva/sclera: Conjunctivae normal. Pupils: Pupils are equal, round, and reactive to light. Neck:      Thyroid: No thyroid mass or thyromegaly. Vascular: No carotid bruit or JVD. Trachea: Trachea normal.   Cardiovascular:      Rate and Rhythm: Normal rate and regular rhythm. Heart sounds: Normal heart sounds. No murmur heard. No gallop. Pulmonary:      Effort: Pulmonary effort is normal. No respiratory distress. Breath sounds: Normal breath sounds. No wheezing or rales. Abdominal:      General: Bowel sounds are normal. There is no distension. Palpations: Abdomen is soft. There is no hepatomegaly, splenomegaly or mass. Tenderness: There is no abdominal tenderness. Musculoskeletal:         General: Normal range of motion. Cervical back: Normal range of motion and neck supple. Lymphadenopathy:      Cervical: No cervical adenopathy. Skin:     General: Skin is warm and dry. Findings: Rash present. Neurological:      Mental Status: She is alert and oriented to person, place, and time. Cranial Nerves: No cranial nerve deficit. Deep Tendon Reflexes: Reflexes are normal and symmetric. Psychiatric:         Behavior: Behavior normal.         Thought Content: Thought content normal.         Judgment: Judgment normal.         Assessment:       Diagnosis Orders   1. Migraine without aura and without status migrainosus, not intractable     2. Arnold-Chiari malformation, type I ([address]     3. Anxiety     4. Transaminitis  CBC with Auto Differential    Comprehensive Metabolic Panel    TSH    Uric Acid          Plan:      #  Migraines controlled. On Imitrex prn. #  Anxiety on Prozac and Vistaril. #  Arnold Chiari malformation stable. #  Fatty liver. Check labs.                  Timbo Coates MD

## 2022-03-16 LAB
A/G RATIO: 2.1 (ref 1.1–2.2)
ALBUMIN SERPL-MCNC: 4.7 G/DL (ref 3.4–5)
ALP BLD-CCNC: 85 U/L (ref 40–129)
ALT SERPL-CCNC: 19 U/L (ref 10–40)
ANION GAP SERPL CALCULATED.3IONS-SCNC: 18 MMOL/L (ref 3–16)
AST SERPL-CCNC: 17 U/L (ref 15–37)
BILIRUB SERPL-MCNC: 0.3 MG/DL (ref 0–1)
BUN BLDV-MCNC: 13 MG/DL (ref 7–20)
CALCIUM SERPL-MCNC: 9.5 MG/DL (ref 8.3–10.6)
CHLORIDE BLD-SCNC: 104 MMOL/L (ref 99–110)
CO2: 20 MMOL/L (ref 21–32)
CREAT SERPL-MCNC: 0.8 MG/DL (ref 0.6–1.1)
GFR AFRICAN AMERICAN: >60
GFR NON-AFRICAN AMERICAN: >60
GLUCOSE BLD-MCNC: 86 MG/DL (ref 70–99)
POTASSIUM SERPL-SCNC: 4.4 MMOL/L (ref 3.5–5.1)
SODIUM BLD-SCNC: 142 MMOL/L (ref 136–145)
TOTAL PROTEIN: 6.9 G/DL (ref 6.4–8.2)
TSH SERPL DL<=0.05 MIU/L-ACNC: 2.2 UIU/ML (ref 0.27–4.2)
URIC ACID, SERUM: 5.3 MG/DL (ref 2.6–6)

## 2022-03-16 NOTE — TELEPHONE ENCOUNTER
From: Gina Holliday  To: Dr. Joe Aleman: 3/15/2022 6:47 PM EDT  Subject: Weight loss clinic    [facility],    I have reached out to a nutritionist that my insurance covers. Can I have a referral sent over to [id number]? It is [facility]. Thank you.      Erica

## 2022-03-30 ENCOUNTER — TELEPHONE (OUTPATIENT)
Dept: INTERNAL MEDICINE CLINIC | Age: 27
End: 2022-03-30

## 2022-03-30 NOTE — TELEPHONE ENCOUNTER
----- Message from Dennys Clements MD sent at 3/30/2022 11:31 AM EDT -----  Contact: Geoff Youngblood  [phone]  Tomorrow   ----- Message -----  From: iMchael Tanisha  Sent: 3/30/2022   9:16 AM EDT  To: Dennys Clements MD    Patient wanting to know if you want her to return to work tomorrow or wait for the covid test results?  ----- Message -----  From: Dennys Clements MD  Sent: 3/30/2022   6:59 AM EDT  To: Lisa Collins test  Return to work Thursday   ----- Message -----  From: Mariana Sergio: 3/29/2022   4:57 PM EDT  To: Dennys Clements MD      ----- Message -----  From: Susie Narayan  Sent: 3/29/2022   4:53 PM EDT  To: Catia Mehta MD    Patient called and stated that she has been running a low grade fever. Patient states that she has been vomiting and has had diarrhea . 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 I ask if she had taken a Covid test she said she had not I did suggest it        Jaylyn Dalal - [phone] - F [phone] (Ph: [phone])

## 2022-03-30 NOTE — TELEPHONE ENCOUNTER
----- Message from Dennys Clements MD sent at 3/30/2022  6:58 AM EDT -----  Contact: Geoff Youngblood  [phone]  COVID test  Return to work Thursday   ----- Message -----  From: Amena Garay  Sent: 3/29/2022   4:57 PM EDT  To: Dennys Clements MD      ----- Message -----  From: Susie Narayan  Sent: 3/29/2022   4:53 PM EDT  To: Catia Mehta MD    Patient called and stated that she has been running a low grade fever. Patient states that she has been vomiting and has had diarrhea . 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 I ask if she had taken a Covid test she said she had not I did suggest it        Jaylyn Dalal - P [phone] - F [phone] (Ph: [phone])

## 2022-03-30 NOTE — TELEPHONE ENCOUNTER
----- Message from Clinton Ket sent at 3/30/2022  1:29 PM EDT -----  Contact: [phone] (M)  The fax number for the pt's work is [phone].     Thank you

## 2022-03-30 NOTE — TELEPHONE ENCOUNTER
----- Message from Suzanne Vaughn MD sent at 3/30/2022 11:31 AM EDT -----  Contact: Blanca Du  [phone]  Tomorrow   ----- Message -----  From: Chico Khan  Sent: 3/30/2022   9:16 AM EDT  To: Suzanne Vaughn MD    Patient wanting to know if you want her to return to work tomorrow or wait for the covid test results?  ----- Message -----  From: Suzanne Vaughn MD  Sent: 3/30/2022   6:59 AM EDT  To: Lizzette Vasquez test  Return to work Thursday   ----- Message -----  From: Donny Ledbetter: 3/29/2022   4:57 PM EDT  To: Suzanne Vaughn MD      ----- Message -----  From: Kate Moran  Sent: 3/29/2022   4:53 PM EDT  To: Melvin Colón MD    Patient called and stated that she has been running a low grade fever. Patient states that she has been vomiting and has had diarrhea . 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 I ask if she had taken a Covid test she said she had not I did suggest it        [facility] Tapia  Main - P [phone] - F [phone] (Ph: [phone])

## 2022-05-12 RX ORDER — FLUOXETINE HYDROCHLORIDE 20 MG/1
20 CAPSULE ORAL DAILY
Qty: 90 CAPSULE | Refills: 0 | Status: SHIPPED | OUTPATIENT
Start: 2022-05-12 | End: 2022-09-07

## 2022-05-19 ENCOUNTER — TELEPHONE (OUTPATIENT)
Dept: INTERNAL MEDICINE CLINIC | Age: 27
End: 2022-05-19

## 2022-05-19 RX ORDER — METHYLPREDNISOLONE 4 MG/1
TABLET ORAL
Qty: 1 KIT | Refills: 0 | Status: SHIPPED | OUTPATIENT
Start: 2022-05-19 | End: 2022-05-25

## 2022-05-19 NOTE — TELEPHONE ENCOUNTER
----- Message from Vicente Henderson MD sent at 5/19/2022 12:14 PM EDT -----  Contact: Jenniafia Salastammie [phone]  Medrol dose pack  ----- Message -----  From: Jeanie Beavers  Sent: 5/19/2022   8:40 AM EDT  To: MD Dr. Jung Pretty patient:    Patient states she has poison ivy on her legs, arms, feet, and stomach. She is asking for a steroid. She states this her \"yearly routine. \"    [phone] Britni Chapman [phone]    Thank you

## 2022-05-26 ENCOUNTER — TELEPHONE (OUTPATIENT)
Dept: INTERNAL MEDICINE CLINIC | Age: 27
End: 2022-05-26

## 2022-05-26 RX ORDER — PREDNISONE 20 MG/1
20 TABLET ORAL DAILY
Qty: 5 TABLET | Refills: 0 | Status: SHIPPED | OUTPATIENT
Start: 2022-05-26 | End: 2022-05-31

## 2022-05-26 NOTE — TELEPHONE ENCOUNTER
----- Message from Deena Espinosa MD sent at 5/26/2022  1:27 PM EDT -----  Prednisone 20 mg dialy x 5 days  ----- Message -----  From: Juan Bhakta  Sent: 5/26/2022  10:32 AM EDT  To: MD Dr. Joan Ricci    Patient states she was given medication for poison ivy last week and it has still not cleared up. Is requesting something stronger to try before seeing a dermatologist.  Patient was prescribed medrol dosepack last week. Please advise.     [facility] NIRU

## 2022-06-07 ENCOUNTER — TELEPHONE (OUTPATIENT)
Dept: INTERNAL MEDICINE CLINIC | Age: 27
End: 2022-06-07

## 2022-06-07 RX ORDER — SUMATRIPTAN 100 MG/1
TABLET, FILM COATED ORAL
Qty: 9 TABLET | Refills: 2 | Status: SHIPPED | OUTPATIENT
Start: 2022-06-07

## 2022-06-07 NOTE — TELEPHONE ENCOUNTER
----- Message from Jessy Marin sent at 6/7/2022  8:46 AM EDT -----  Contact: JUANY [phone]  Patient requesting SUMAtriptan (IMITREX) 100 MG tablet sent to new pharmacy.     Larisa [address]    Thank you

## 2022-06-09 RX ORDER — HYDROXYZINE 50 MG/1
50 TABLET, FILM COATED ORAL 3 TIMES DAILY PRN
Qty: 270 TABLET | Refills: 0 | Status: SHIPPED | OUTPATIENT
Start: 2022-06-09

## 2022-06-29 ENCOUNTER — PATIENT MESSAGE (OUTPATIENT)
Dept: INTERNAL MEDICINE CLINIC | Age: 27
End: 2022-06-29

## 2022-06-29 ENCOUNTER — HOSPITAL ENCOUNTER (OUTPATIENT)
Dept: CT IMAGING | Age: 27
Discharge: HOME | End: 2022-06-29
Payer: COMMERCIAL

## 2022-06-29 DIAGNOSIS — S09.90XA INJURY OF HEAD, INITIAL ENCOUNTER: Primary | ICD-10-CM

## 2022-06-29 DIAGNOSIS — S09.90XA INJURY OF HEAD, INITIAL ENCOUNTER: ICD-10-CM

## 2022-06-29 PROCEDURE — 70450 CT HEAD/BRAIN W/O DYE: CPT

## 2022-06-29 NOTE — TELEPHONE ENCOUNTER
From: Cheryl Rolon  To: Dr. Kim Lala: 6/29/2022 11:00 AM EDT  Subject: Head Injury    Hi,    Last night I was knocked down by my dog. I fell backwards onto my head and back. I hit my head pretty hard on the ground (grass), I think I may have passed out for a few seconds. This happened at about 7:30 last night, I took aleve last night and tylenol today as well as slept with an ice pack. The pain in my head is not subsiding and I'm not feeling quite right today. Just wondering if I should be seen. Thank you.

## 2022-07-14 ENCOUNTER — HOSPITAL ENCOUNTER (OUTPATIENT)
Age: 27
Discharge: HOME | End: 2022-07-14
Payer: COMMERCIAL

## 2022-07-14 LAB
A/G RATIO: 1.8 (ref 1.1–2.2)
ALBUMIN SERPL-MCNC: 4.4 G/DL (ref 3.4–5)
ALP BLD-CCNC: 83 U/L (ref 40–129)
ALT SERPL-CCNC: 16 U/L (ref 10–40)
ANION GAP SERPL CALCULATED.3IONS-SCNC: 11 MMOL/L (ref 3–16)
AST SERPL-CCNC: 15 U/L (ref 15–37)
BASOPHILS ABSOLUTE: 0 K/UL (ref 0–0.2)
BASOPHILS RELATIVE PERCENT: 0.6 %
BILIRUB SERPL-MCNC: 0.6 MG/DL (ref 0–1)
BUN BLDV-MCNC: 16 MG/DL (ref 7–20)
CALCIUM SERPL-MCNC: 9.6 MG/DL (ref 8.3–10.6)
CHLORIDE BLD-SCNC: 105 MMOL/L (ref 99–110)
CO2: 25 MMOL/L (ref 21–32)
CREAT SERPL-MCNC: 0.6 MG/DL (ref 0.6–1.1)
EOSINOPHILS ABSOLUTE: 0 K/UL (ref 0–0.6)
EOSINOPHILS RELATIVE PERCENT: 0.6 %
FERRITIN: 55.6 NG/ML (ref 15–150)
GFR AFRICAN AMERICAN: >60
GFR NON-AFRICAN AMERICAN: >60
GLUCOSE BLD-MCNC: 85 MG/DL (ref 70–99)
HAV IGM SER IA-ACNC: NORMAL
HCT VFR BLD CALC: 41.8 % (ref 36–48)
HEMOGLOBIN: 14 G/DL (ref 12–16)
HEPATITIS B CORE IGM ANTIBODY: NORMAL
HEPATITIS B SURFACE ANTIGEN INTERPRETATION: NORMAL
HEPATITIS C ANTIBODY INTERPRETATION: NORMAL
IGA: 195 MG/DL (ref 70–400)
INR BLD: 0.93 (ref 0.87–1.14)
IRON SATURATION: 36 % (ref 15–50)
IRON: 104 UG/DL (ref 37–145)
LIPASE: 42 U/L (ref 13–60)
LYMPHOCYTES ABSOLUTE: 2.2 K/UL (ref 1–5.1)
LYMPHOCYTES RELATIVE PERCENT: 27.5 %
MCH RBC QN AUTO: 28.6 PG (ref 26–34)
MCHC RBC AUTO-ENTMCNC: 33.5 G/DL (ref 31–36)
MCV RBC AUTO: 85.3 FL (ref 80–100)
MONOCYTES ABSOLUTE: 0.6 K/UL (ref 0–1.3)
MONOCYTES RELATIVE PERCENT: 7.4 %
NEUTROPHILS ABSOLUTE: 5 K/UL (ref 1.7–7.7)
NEUTROPHILS RELATIVE PERCENT: 63.9 %
PDW BLD-RTO: 12.9 % (ref 12.4–15.4)
PLATELET # BLD: 237 K/UL (ref 135–450)
PMV BLD AUTO: 8.8 FL (ref 5–10.5)
POTASSIUM SERPL-SCNC: 4.3 MMOL/L (ref 3.5–5.1)
PROTHROMBIN TIME: 12.2 SEC (ref 11.7–14.5)
RBC # BLD: 4.9 M/UL (ref 4–5.2)
SODIUM BLD-SCNC: 141 MMOL/L (ref 136–145)
TOTAL IRON BINDING CAPACITY: 290 UG/DL (ref 260–445)
TOTAL PROTEIN: 6.9 G/DL (ref 6.4–8.2)
WBC # BLD: 7.8 K/UL (ref 4–11)

## 2022-07-14 PROCEDURE — 82105 ALPHA-FETOPROTEIN SERUM: CPT

## 2022-07-14 PROCEDURE — 82103 ALPHA-1-ANTITRYPSIN TOTAL: CPT

## 2022-07-14 PROCEDURE — 80053 COMPREHEN METABOLIC PANEL: CPT

## 2022-07-14 PROCEDURE — 83550 IRON BINDING TEST: CPT

## 2022-07-14 PROCEDURE — 83540 ASSAY OF IRON: CPT

## 2022-07-14 PROCEDURE — 86015 ACTIN ANTIBODY EACH: CPT

## 2022-07-14 PROCEDURE — 36415 COLL VENOUS BLD VENIPUNCTURE: CPT

## 2022-07-14 PROCEDURE — 83013 H PYLORI (C-13) BREATH: CPT

## 2022-07-14 PROCEDURE — 86038 ANTINUCLEAR ANTIBODIES: CPT

## 2022-07-14 PROCEDURE — 82784 ASSAY IGA/IGD/IGG/IGM EACH: CPT

## 2022-07-14 PROCEDURE — 85610 PROTHROMBIN TIME: CPT

## 2022-07-14 PROCEDURE — 83516 IMMUNOASSAY NONANTIBODY: CPT

## 2022-07-14 PROCEDURE — 82104 ALPHA-1-ANTITRYPSIN PHENO: CPT

## 2022-07-14 PROCEDURE — 85025 COMPLETE CBC W/AUTO DIFF WBC: CPT

## 2022-07-14 PROCEDURE — 82728 ASSAY OF FERRITIN: CPT

## 2022-07-14 PROCEDURE — 80074 ACUTE HEPATITIS PANEL: CPT

## 2022-07-14 PROCEDURE — 83690 ASSAY OF LIPASE: CPT

## 2022-07-15 LAB
ANTI-NUCLEAR ANTIBODY (ANA): NEGATIVE
TISSUE TRANSGLUTAMINASE IGA: <0.5 U/ML (ref 0–14)

## 2022-07-16 LAB
F-ACTIN AB IGG: 5 UNITS (ref 0–19)
H PYLORI BREATH TEST: NEGATIVE

## 2022-07-19 LAB
AFP: 1.4 UG/L
ALPHA-1 ANTITRYPSIN PHENOTYPE: NORMAL
ALPHA-1 ANTITRYPSIN: 126 MG/DL (ref 90–200)
MITOCHONDRIAL M2 AB, IGG: 2.5 U/ML (ref 0–4)

## 2022-09-07 RX ORDER — FLUOXETINE HYDROCHLORIDE 20 MG/1
CAPSULE ORAL
Qty: 30 CAPSULE | Refills: 0 | Status: SHIPPED | OUTPATIENT
Start: 2022-09-07 | End: 2022-10-04

## 2022-09-30 ENCOUNTER — TELEPHONE (OUTPATIENT)
Dept: INTERNAL MEDICINE CLINIC | Age: 27
End: 2022-09-30

## 2022-09-30 NOTE — TELEPHONE ENCOUNTER
----- Message from Jose F Hyatt sent at 9/30/2022  4:33 PM EDT -----  Contact: [facility] [phone]  Per Dr. Caroline Rashid- check for C-diff  ----- Message -----  From: Krzysztof Benson  Sent: 9/30/2022   2:34 PM EDT  To: Sully Talbot MD    Patient of Dr. Bree Alston  Patient states for about 3 weeks when she eats she has sever diarrhea and vomiting. Patient has started to lose appetite and is requesting an appointment.

## 2022-10-04 RX ORDER — FLUOXETINE HYDROCHLORIDE 20 MG/1
CAPSULE ORAL
Qty: 30 CAPSULE | Refills: 0 | Status: SHIPPED | OUTPATIENT
Start: 2022-10-04 | End: 2022-10-18 | Stop reason: SDUPTHER

## 2022-10-18 ENCOUNTER — OFFICE VISIT (OUTPATIENT)
Dept: INTERNAL MEDICINE CLINIC | Age: 27
End: 2022-10-18

## 2022-10-18 VITALS
HEART RATE: 72 BPM | SYSTOLIC BLOOD PRESSURE: 128 MMHG | WEIGHT: 180 LBS | RESPIRATION RATE: 14 BRPM | HEIGHT: 62 IN | DIASTOLIC BLOOD PRESSURE: 86 MMHG | BODY MASS INDEX: 33.13 KG/M2

## 2022-10-18 DIAGNOSIS — Z00.00 ENCOUNTER FOR WELL ADULT EXAM WITHOUT ABNORMAL FINDINGS: Primary | ICD-10-CM

## 2022-10-18 DIAGNOSIS — R21 RASH AND NONSPECIFIC SKIN ERUPTION: ICD-10-CM

## 2022-10-18 DIAGNOSIS — F41.9 ANXIETY: ICD-10-CM

## 2022-10-18 DIAGNOSIS — Z00.00 ENCOUNTER FOR WELL ADULT EXAM WITHOUT ABNORMAL FINDINGS: ICD-10-CM

## 2022-10-18 LAB
A/G RATIO: 1.5 (ref 1.1–2.2)
ALBUMIN SERPL-MCNC: 3.8 G/DL (ref 3.4–5)
ALP BLD-CCNC: 77 U/L (ref 40–129)
ALT SERPL-CCNC: 14 U/L (ref 10–40)
ANION GAP SERPL CALCULATED.3IONS-SCNC: 10 MMOL/L (ref 3–16)
AST SERPL-CCNC: 14 U/L (ref 15–37)
BASOPHILS ABSOLUTE: 0 K/UL (ref 0–0.2)
BASOPHILS RELATIVE PERCENT: 0.6 %
BILIRUB SERPL-MCNC: 0.4 MG/DL (ref 0–1)
BUN BLDV-MCNC: 12 MG/DL (ref 7–20)
CALCIUM SERPL-MCNC: 9.2 MG/DL (ref 8.3–10.6)
CHLORIDE BLD-SCNC: 105 MMOL/L (ref 99–110)
CO2: 26 MMOL/L (ref 21–32)
CREAT SERPL-MCNC: 0.7 MG/DL (ref 0.6–1.1)
EOSINOPHILS ABSOLUTE: 0.2 K/UL (ref 0–0.6)
EOSINOPHILS RELATIVE PERCENT: 3.4 %
GFR SERPL CREATININE-BSD FRML MDRD: >60 ML/MIN/{1.73_M2}
GLUCOSE BLD-MCNC: 88 MG/DL (ref 70–99)
HCT VFR BLD CALC: 41.8 % (ref 36–48)
HEMOGLOBIN: 13.8 G/DL (ref 12–16)
LYMPHOCYTES ABSOLUTE: 1.7 K/UL (ref 1–5.1)
LYMPHOCYTES RELATIVE PERCENT: 27.8 %
MCH RBC QN AUTO: 28.3 PG (ref 26–34)
MCHC RBC AUTO-ENTMCNC: 33.1 G/DL (ref 31–36)
MCV RBC AUTO: 85.6 FL (ref 80–100)
MONOCYTES ABSOLUTE: 0.4 K/UL (ref 0–1.3)
MONOCYTES RELATIVE PERCENT: 6.5 %
NEUTROPHILS ABSOLUTE: 3.7 K/UL (ref 1.7–7.7)
NEUTROPHILS RELATIVE PERCENT: 61.7 %
PDW BLD-RTO: 12.8 % (ref 12.4–15.4)
PLATELET # BLD: 234 K/UL (ref 135–450)
PMV BLD AUTO: 8.4 FL (ref 5–10.5)
POTASSIUM SERPL-SCNC: 4 MMOL/L (ref 3.5–5.1)
RBC # BLD: 4.88 M/UL (ref 4–5.2)
SODIUM BLD-SCNC: 141 MMOL/L (ref 136–145)
TOTAL PROTEIN: 6.3 G/DL (ref 6.4–8.2)
WBC # BLD: 6 K/UL (ref 4–11)

## 2022-10-18 PROCEDURE — G8484 FLU IMMUNIZE NO ADMIN: HCPCS

## 2022-10-18 PROCEDURE — 99395 PREV VISIT EST AGE 18-39: CPT

## 2022-10-18 RX ORDER — FLUOXETINE HYDROCHLORIDE 20 MG/1
CAPSULE ORAL
Qty: 30 CAPSULE | Refills: 1 | Status: SHIPPED | OUTPATIENT
Start: 2022-11-01

## 2022-10-18 ASSESSMENT — ENCOUNTER SYMPTOMS
WHEEZING: 0
COUGH: 0
VOMITING: 0
DIARRHEA: 0
SINUS PRESSURE: 0
ABDOMINAL PAIN: 0
NAUSEA: 0
SHORTNESS OF BREATH: 0
CONSTIPATION: 0
PHOTOPHOBIA: 0
EYE REDNESS: 0
SORE THROAT: 0
SINUS PAIN: 0
TROUBLE SWALLOWING: 0
EYE PAIN: 0
RHINORRHEA: 0

## 2022-10-18 NOTE — PATIENT INSTRUCTIONS
Well Visit, Ages 25 to 72: Care Instructions  Well visits can help you stay healthy. Your doctor has checked your overall health and may have suggested ways to take good care of yourself. Your doctor also may have recommended tests. You can help prevent illness with healthy eating, good sleep, vaccinations, regular exercise, and other steps. Get the tests that you and your doctor decide on. Depending on your age and risks, examples might include screening for diabetes; hepatitis C; HIV; and cervical, breast, lung, and colon cancer. Screening helps find diseases before any symptoms appear. Eat healthy foods. Choose fruits, vegetables, whole grains, lean protein, and low-fat dairy foods. Limit saturated fat and reduce salt. Limit alcohol. Men should have no more than 2 drinks a day. Women should have no more than 1. For some people, no alcohol is the best choice. Exercise. Get at least 30 minutes of exercise on most days of the week. Walking can be a good choice. Reach and stay at your healthy weight. This will lower your risk for many health problems. Take care of your mental health. Try to stay connected with friends, family, and community, and find ways to manage stress. If you're feeling depressed or hopeless, talk to someone. A counselor can help. If you don't have a counselor, talk to your doctor. Talk to your doctor if you think you may have a problem with alcohol or drug use. This includes prescription medicines and illegal drugs. Avoid tobacco and nicotine: Don't smoke, vape, or chew. If you need help quitting, talk to your doctor. Practice safer sex. Getting tested, using condoms or dental dams, and limiting sex partners can help prevent STIs. Use birth control if it's important to you to prevent pregnancy. Talk with your doctor about your choices and what might be best for you. Prevent problems where you can.  Protect your skin from too much sun, wash your hands, brush your teeth twice a day, and wear a seat belt in the car. Where can you learn more? Go to https://chpepiceweb.[email or web address]. org and sign in to your [employer] account. Enter P072 in the [facility] box to learn more about \"Well Visit, Ages 25 to 72: Care Instructions. \"     If you do not have an account, please click on the \"Sign Up Now\" link. Current as of: March 9, 2022               Content Version: 13.4  © 8098-4442 Healthwise, Incorporated. Care instructions adapted under license by [facility] ([facility]). If you have questions about a medical condition or this instruction, always ask your healthcare professional. [address] any warranty or liability for your use of this information.

## 2022-10-18 NOTE — PROGRESS NOTES
Well Adult Note  Name: Eusebio Shepherd Date: 10/18/2022   MRN: [id number] Sex: Female   Age: 27 y.o. Ethnicity: Non- / Non    : 1995 Race: White (non-)      Emily Brambila is here for well adult exam.  History:  Since her last visit she feels well overall. She endorses a chronic recurrent pruritic rash. She states that it has been intermittent and denies obvious triggers. It happens typically once per year. States it is currently on her back which is a place it has never been. She also states it never usually occurs in the winter. She has seen both a dermatologist and allergist for this without explanation. She states approximately 3 weeks ago she was very sick, nauseous and vomiting and went to [facility] in [facility]; at that time she received blood work and was diagnosed with Hepatitis A. I cannot see these results to confirm. She has since experienced improvement in symptoms. Her anxiety is well controlled with Prozac. She infrequently forgets to take her medication and can tell when she does. Migraines are well controlled and she does not have to utilized imitrex often    She has done well with diet and exercise and has lost weight since her last visit  Does not smoke or abuse alcohol   Does not struggle with depression  Sexually active with  and are not preventing pregnancy    Review of Systems   Constitutional:  Negative for chills, fatigue and fever. HENT:  Negative for congestion, dental problem, hearing loss, rhinorrhea, sinus pressure, sinus pain, sore throat, tinnitus and trouble swallowing. Eyes:  Negative for photophobia, pain, redness and visual disturbance. Respiratory:  Negative for cough, shortness of breath and wheezing. Cardiovascular:  Negative for chest pain and palpitations. Gastrointestinal:  Negative for abdominal pain, constipation, diarrhea, nausea and vomiting. Endocrine: Negative for polyuria. Genitourinary:  Negative for difficulty urinating, dyspareunia, dysuria, frequency, pelvic pain, urgency, vaginal discharge and vaginal pain. Musculoskeletal:  Negative for arthralgias and myalgias. Skin:  Positive for rash. Negative for wound. Allergic/Immunologic: Negative for environmental allergies, food allergies and immunocompromised state. Neurological:  Negative for dizziness, tremors, seizures, weakness, light-headedness and numbness. Hematological:  Negative for adenopathy. Does not bruise/bleed easily. Psychiatric/Behavioral:  Negative for agitation, behavioral problems and confusion. The patient is not nervous/anxious. Allergies   Allergen Reactions    Percocet [Oxycodone-Acetaminophen] Itching    Amoxicillin-Pot Clavulanate Nausea And Vomiting         Prior to Visit Medications    Medication Sig Taking? Authorizing Provider   FLUoxetine (PROZAC) 20 MG capsule TAKE 1 CAPSULE BY MOUTH ONE TIME A DAY Yes NARGIS Mims   triamcinolone (KENALOG) 0.1 % ointment Place topically 2 times daily. Yes NARGIS Calhoun   hydrOXYzine HCl (ATARAX) 50 MG tablet Take 1 tablet by mouth 3 times daily as needed for Anxiety Yes May Willis MD   SUMAtriptan (IMITREX) 100 MG tablet Take one tablet for a severe headache/migraine. May repeat one tablet in 2 hours if needed one time. Do not use more than 2 tablets in 24 hours. Do not use more than 8 tablets in one month.  Yes May Willis MD         Past Medical History:   Diagnosis Date    Anxiety     Arnold-Chiari malformation, type I ([facility] [address] 12/11/2017    Arnold-Chiari malformation, type II ([facility] [address] 12/11/2017    Chiari I malformation ([facility] [address]     Concussion 11/2013    Depression     Migraine without aura and without status migrainosus, not intractable 12/11/2017    PONV (postoperative nausea and vomiting)        Past Surgical History:   Procedure Laterality Date    PLANTAR FASCIA SURGERY Left 4/28/2021    ENDOSCOPIC PLANTAR FASCIOTOMY LEFT FOOT performed by Emmanuel Wilson DPM at [address] [place] [address] Right 05/12/2021    PLANTAR FASCIA SURGERY Right 5/12/2021    ENDOSCOPIC PLANTAR FASCIOTOMY RIGHT FOOT performed by Emmanuel Wilson DPM at [facility]           Family History   Problem Relation Age of Onset    High Blood Pressure Mother     High Blood Pressure Father     Allergy (Severe) Brother     Heart Disease Maternal Grandmother     Heart Disease Maternal Grandfather     Heart Disease Paternal Grandmother     Heart Disease Paternal Grandfather     Diabetes Maternal Cousin        Social History     Tobacco Use    Smoking status: Never    Smokeless tobacco: Never   Substance Use Topics    Alcohol use: No    Drug use: No       Objective   /86 (Site: Right Upper Arm, Position: Sitting, Cuff Size: Large Adult)   Pulse 72   Resp 14   Ht 5' 2\" (1.575 m)   Wt 180 lb (81.6 kg)   LMP 10/15/2022 (Approximate)   BMI 32.92 kg/m²   Wt Readings from Last 3 Encounters:   10/18/22 180 lb (81.6 kg)   03/15/22 186 lb (84.4 kg)   08/05/21 165 lb 8 oz (75.1 kg)     There were no vitals filed for this visit. Physical Exam  Constitutional:       General: She is not in acute distress. Appearance: Normal appearance. She is normal weight. She is not ill-appearing. HENT:      Head: Normocephalic and atraumatic. Right Ear: Tympanic membrane, ear canal and external ear normal.      Left Ear: Tympanic membrane, ear canal and external ear normal.      Nose: Nose normal. No congestion or rhinorrhea. Mouth/Throat:      Mouth: Mucous membranes are moist.      Pharynx: Oropharynx is clear. No oropharyngeal exudate or posterior oropharyngeal erythema. Eyes:      Extraocular Movements: Extraocular movements intact. Conjunctiva/sclera: Conjunctivae normal.      Pupils: Pupils are equal, round, and reactive to light. Cardiovascular:      Rate and Rhythm: Normal rate and regular rhythm.       Pulses: Normal pulses. Heart sounds: Normal heart sounds. No murmur heard. No friction rub. No gallop. Pulmonary:      Effort: Pulmonary effort is normal.      Breath sounds: Normal breath sounds. No wheezing, rhonchi or rales. Abdominal:      General: Abdomen is flat. Bowel sounds are normal.      Palpations: Abdomen is soft. There is no mass. Tenderness: There is no abdominal tenderness. Hernia: No hernia is present. Musculoskeletal:         General: No swelling, tenderness, deformity or signs of injury. Normal range of motion. Cervical back: Normal range of motion. Lymphadenopathy:      Cervical: No cervical adenopathy. Skin:     General: Skin is warm and dry. Capillary Refill: Capillary refill takes less than 2 seconds. Findings: Rash (located right lower back, mildy erythematous, non-pustular, non-vesicular, puritic per patient) present. No lesion. Neurological:      General: No focal deficit present. Mental Status: She is oriented to person, place, and time. Psychiatric:         Mood and Affect: Mood normal.         Behavior: Behavior normal.         Thought Content:  Thought content normal.       Personalized Preventive Plan   Current Health Maintenance Status  Immunization History   Administered Date(s) Administered    COVID-19, MODERNA BLUE border, Primary or Immunocompromised, (age 12y+), IM, 100 mcg/0.5mL 01/12/2021, 02/08/2021    Influenza Virus Vaccine 10/05/2020    Influenza, FLUBLOK, (age 25 y+), PF, 0.5mL 09/23/2019    PPD Test 09/16/2019, 09/23/2019        Health Maintenance   Topic Date Due    COVID-19 Vaccine (3 - Booster for Moderna series) 07/08/2021    [address] screen  10/18/2023 (Originally 12/4/2013)    Pap smear  10/18/2023 (Originally 12/4/2018)    Depression Screen  03/15/2023    DTaP/Tdap/Td vaccine (5 - Td or Tdap) 08/19/2028    Hepatitis A vaccine  Completed    Hib vaccine  Completed    HPV vaccine  Completed    Varicella vaccine  Completed Meningococcal (ACWY) vaccine  Completed    Flu vaccine  Completed    Hepatitis C screen  Completed    HIV screen  Completed    Pneumococcal 0-64 years Vaccine  Aged Out     Recommendations for Preventive Services Due: see orders and patient instructions/AVS.    Assessment:   1. Encounter for well adult exam without abnormal findings  - CBC with Auto Differential; Future  - Comprehensive Metabolic Panel; Future  - FLUoxetine (PROZAC) 20 MG capsule; TAKE 1 CAPSULE BY MOUTH ONE TIME A DAY  Dispense: 30 capsule; Refill: 1    2. Rash and nonspecific skin eruption  - triamcinolone (KENALOG) 0.1 % ointment; Place topically 2 times daily. Dispense: 1 each; Refill: 0    3. Anxiety  - FLUoxetine (PROZAC) 20 MG capsule; TAKE 1 CAPSULE BY MOUTH ONE TIME A DAY  Dispense: 30 capsule; Refill: 1                 Plan:   Annual exam.    Nonspecific rash. Has seen both dermatology and allergist without explanation. Will give kenalog to aide with itching. Consider return to primary derm or 2nd opinion. Patient reports Hep A infection approx 3 weeks ago at [facility]. Call to obtain records and confirm this was reported. Typically self-limiting infection. Check CBC and CMP. Anxiety controlled on Prozac    Migraines well controlled. She has seen her OB/GYN in August and received HPV screen, G&C screen and Pap smear. She has received her yearly influenza vaccination    Keep up diet and exercise for continued weight loss. Return in 1 year (on 10/18/2023). Sooner if needed.        Griffin Lafleur PA-C  10/18/2022 11:27 AM

## 2022-11-03 ENCOUNTER — TELEPHONE (OUTPATIENT)
Dept: INTERNAL MEDICINE CLINIC | Age: 27
End: 2022-11-03

## 2022-11-03 NOTE — TELEPHONE ENCOUNTER
----- Message from Evangelina Patiño sent at 11/3/2022 11:08 AM EDT -----  Per Dr. Donta Lane will need to fill out the form. 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  Katheryn Green saw patient for a physical.  Are you able to sign form or does Ira need to? Patient also needing second  TB.

## 2022-11-03 NOTE — TELEPHONE ENCOUNTER
----- Message from Demarco Workman sent at 11/3/2022 11:08 AM EDT -----  Per Dr. Anisa Okeefe will need to fill out the form. 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  Darshan Valerio saw patient for a physical.  Are you able to sign form or does Ira need to? Patient also needing second  TB.

## 2023-01-03 DIAGNOSIS — F41.9 ANXIETY: ICD-10-CM

## 2023-01-03 DIAGNOSIS — Z00.00 ENCOUNTER FOR WELL ADULT EXAM WITHOUT ABNORMAL FINDINGS: ICD-10-CM

## 2023-01-03 RX ORDER — FLUOXETINE HYDROCHLORIDE 20 MG/1
CAPSULE ORAL
Qty: 30 CAPSULE | Refills: 0 | Status: SHIPPED | OUTPATIENT
Start: 2023-01-03

## 2023-01-24 ENCOUNTER — HOSPITAL ENCOUNTER (EMERGENCY)
Age: 28
Discharge: HOME | End: 2023-01-24
Payer: COMMERCIAL

## 2023-01-24 VITALS
SYSTOLIC BLOOD PRESSURE: 109 MMHG | OXYGEN SATURATION: 99 % | HEIGHT: 63 IN | BODY MASS INDEX: 30.65 KG/M2 | TEMPERATURE: 97.9 F | HEART RATE: 69 BPM | DIASTOLIC BLOOD PRESSURE: 60 MMHG | WEIGHT: 173 LBS | RESPIRATION RATE: 14 BRPM

## 2023-01-24 DIAGNOSIS — Z3A.14 PREGNANCY WITH 14 COMPLETED WEEKS GESTATION: ICD-10-CM

## 2023-01-24 DIAGNOSIS — O23.42 URINARY TRACT INFECTION IN MOTHER DURING SECOND TRIMESTER OF PREGNANCY: Primary | ICD-10-CM

## 2023-01-24 DIAGNOSIS — O21.9 NAUSEA AND VOMITING DURING PREGNANCY: ICD-10-CM

## 2023-01-24 LAB
ALBUMIN SERPL-MCNC: 4 G/DL (ref 3.4–5)
ALP BLD-CCNC: 53 U/L (ref 40–129)
ALT SERPL-CCNC: 15 U/L (ref 10–40)
ANION GAP SERPL CALCULATED.3IONS-SCNC: 9 MMOL/L (ref 3–16)
AST SERPL-CCNC: 14 U/L (ref 15–37)
BACTERIA: ABNORMAL /HPF
BASOPHILS ABSOLUTE: 0 K/UL (ref 0–0.2)
BASOPHILS RELATIVE PERCENT: 0.3 %
BILIRUB SERPL-MCNC: <0.2 MG/DL (ref 0–1)
BILIRUBIN DIRECT: <0.2 MG/DL (ref 0–0.3)
BILIRUBIN URINE: NEGATIVE
BILIRUBIN, INDIRECT: ABNORMAL MG/DL (ref 0–1)
BLOOD, URINE: NEGATIVE
BUN BLDV-MCNC: 7 MG/DL (ref 7–20)
CALCIUM SERPL-MCNC: 8.9 MG/DL (ref 8.3–10.6)
CHLORIDE BLD-SCNC: 101 MMOL/L (ref 99–110)
CLARITY: ABNORMAL
CO2: 24 MMOL/L (ref 21–32)
COLOR: ABNORMAL
CREAT SERPL-MCNC: <0.5 MG/DL (ref 0.6–1.1)
EOSINOPHILS ABSOLUTE: 0.1 K/UL (ref 0–0.6)
EOSINOPHILS RELATIVE PERCENT: 0.8 %
EPITHELIAL CELLS, UA: ABNORMAL /HPF (ref 0–5)
GFR SERPL CREATININE-BSD FRML MDRD: >60 ML/MIN/{1.73_M2}
GLUCOSE BLD-MCNC: 97 MG/DL (ref 70–99)
GLUCOSE URINE: NEGATIVE MG/DL
HCT VFR BLD CALC: 39.3 % (ref 36–48)
HEMOGLOBIN: 13.4 G/DL (ref 12–16)
KETONES, URINE: ABNORMAL MG/DL
LEUKOCYTE ESTERASE, URINE: ABNORMAL
LIPASE: 26 U/L (ref 13–60)
LYMPHOCYTES ABSOLUTE: 2 K/UL (ref 1–5.1)
LYMPHOCYTES RELATIVE PERCENT: 21.4 %
MAGNESIUM: 1.9 MG/DL (ref 1.8–2.4)
MCH RBC QN AUTO: 29.3 PG (ref 26–34)
MCHC RBC AUTO-ENTMCNC: 34.1 G/DL (ref 31–36)
MCV RBC AUTO: 85.9 FL (ref 80–100)
MICROSCOPIC EXAMINATION: YES
MONOCYTES ABSOLUTE: 0.7 K/UL (ref 0–1.3)
MONOCYTES RELATIVE PERCENT: 7.4 %
NEUTROPHILS ABSOLUTE: 6.6 K/UL (ref 1.7–7.7)
NEUTROPHILS RELATIVE PERCENT: 70.1 %
NITRITE, URINE: NEGATIVE
PDW BLD-RTO: 13.6 % (ref 12.4–15.4)
PH UA: 6 (ref 5–8)
PLATELET # BLD: 233 K/UL (ref 135–450)
PMV BLD AUTO: 7.4 FL (ref 5–10.5)
POTASSIUM SERPL-SCNC: 3.7 MMOL/L (ref 3.5–5.1)
PROTEIN UA: NEGATIVE MG/DL
RBC # BLD: 4.58 M/UL (ref 4–5.2)
RBC UA: ABNORMAL /HPF (ref 0–4)
SODIUM BLD-SCNC: 134 MMOL/L (ref 136–145)
SPECIFIC GRAVITY UA: 1.02 (ref 1–1.03)
TOTAL PROTEIN: 6.6 G/DL (ref 6.4–8.2)
URINE REFLEX TO CULTURE: YES
URINE TYPE: ABNORMAL
UROBILINOGEN, URINE: 0.2 E.U./DL
WBC # BLD: 9.4 K/UL (ref 4–11)
WBC UA: ABNORMAL /HPF (ref 0–5)

## 2023-01-24 PROCEDURE — 80076 HEPATIC FUNCTION PANEL: CPT

## 2023-01-24 PROCEDURE — 80048 BASIC METABOLIC PNL TOTAL CA: CPT

## 2023-01-24 PROCEDURE — 96361 HYDRATE IV INFUSION ADD-ON: CPT

## 2023-01-24 PROCEDURE — 6360000002 HC RX W HCPCS: Performed by: PHYSICIAN ASSISTANT

## 2023-01-24 PROCEDURE — 99284 EMERGENCY DEPT VISIT MOD MDM: CPT

## 2023-01-24 PROCEDURE — 2580000003 HC RX 258: Performed by: PHYSICIAN ASSISTANT

## 2023-01-24 PROCEDURE — 96365 THER/PROPH/DIAG IV INF INIT: CPT

## 2023-01-24 PROCEDURE — 96375 TX/PRO/DX INJ NEW DRUG ADDON: CPT

## 2023-01-24 PROCEDURE — 87086 URINE CULTURE/COLONY COUNT: CPT

## 2023-01-24 PROCEDURE — 81001 URINALYSIS AUTO W/SCOPE: CPT

## 2023-01-24 PROCEDURE — 85025 COMPLETE CBC W/AUTO DIFF WBC: CPT

## 2023-01-24 PROCEDURE — 83690 ASSAY OF LIPASE: CPT

## 2023-01-24 PROCEDURE — 83735 ASSAY OF MAGNESIUM: CPT

## 2023-01-24 RX ORDER — 0.9 % SODIUM CHLORIDE 0.9 %
1000 INTRAVENOUS SOLUTION INTRAVENOUS ONCE
Status: COMPLETED | OUTPATIENT
Start: 2023-01-24 | End: 2023-01-24

## 2023-01-24 RX ORDER — DIPHENHYDRAMINE HYDROCHLORIDE 50 MG/ML
12.5 INJECTION INTRAMUSCULAR; INTRAVENOUS ONCE
Status: COMPLETED | OUTPATIENT
Start: 2023-01-24 | End: 2023-01-24

## 2023-01-24 RX ORDER — CEFDINIR 300 MG/1
300 CAPSULE ORAL 2 TIMES DAILY
Qty: 20 CAPSULE | Refills: 0 | Status: SHIPPED | OUTPATIENT
Start: 2023-01-24 | End: 2023-02-03

## 2023-01-24 RX ORDER — METOCLOPRAMIDE HYDROCHLORIDE 5 MG/ML
10 INJECTION INTRAMUSCULAR; INTRAVENOUS ONCE
Status: COMPLETED | OUTPATIENT
Start: 2023-01-24 | End: 2023-01-24

## 2023-01-24 RX ORDER — METOCLOPRAMIDE 10 MG/1
TABLET ORAL
COMMUNITY
Start: 2023-01-09

## 2023-01-24 RX ORDER — ONDANSETRON 4 MG/1
TABLET, FILM COATED ORAL
COMMUNITY
Start: 2023-01-24

## 2023-01-24 RX ORDER — BUTALBITAL, ACETAMINOPHEN AND CAFFEINE 50; 325; 40 MG/1; MG/1; MG/1
1-2 TABLET ORAL EVERY 6 HOURS PRN
COMMUNITY
Start: 2023-01-19

## 2023-01-24 RX ADMIN — CEFTRIAXONE SODIUM 1000 MG: 1 INJECTION, POWDER, FOR SOLUTION INTRAMUSCULAR; INTRAVENOUS at 16:32

## 2023-01-24 RX ADMIN — DIPHENHYDRAMINE HYDROCHLORIDE 12.5 MG: 50 INJECTION, SOLUTION INTRAMUSCULAR; INTRAVENOUS at 15:21

## 2023-01-24 RX ADMIN — SODIUM CHLORIDE 1000 ML: 9 INJECTION, SOLUTION INTRAVENOUS at 15:21

## 2023-01-24 RX ADMIN — METOCLOPRAMIDE 10 MG: 5 INJECTION, SOLUTION INTRAMUSCULAR; INTRAVENOUS at 15:21

## 2023-01-24 ASSESSMENT — PAIN SCALES - GENERAL: PAINLEVEL_OUTOF10: 7

## 2023-01-24 ASSESSMENT — PAIN DESCRIPTION - DESCRIPTORS: DESCRIPTORS: ACHING

## 2023-01-24 ASSESSMENT — PAIN - FUNCTIONAL ASSESSMENT: PAIN_FUNCTIONAL_ASSESSMENT: 0-10

## 2023-01-24 ASSESSMENT — PAIN DESCRIPTION - LOCATION: LOCATION: HEAD

## 2023-01-24 NOTE — ED NOTES
Pt discharged after reviewing AVS, prescriptions and follow up care.  Pt verbalizes understandings and ambulated independently from [address], [facility]  01/24/23 1176

## 2023-01-24 NOTE — ED PROVIDER NOTES
[facility]  ED  EMERGENCY DEPARTMENT ENCOUNTER        Pt Name: Reji Arteaga  MRN: [id number]  Deisytrongflxeus 1995  Date of evaluation: 2023  Provider: Javon Leggett PA-C  PCP: Adalgisa Castillo MD  Note Started: 4:26 PM EST 23      YUVAL. I have evaluated this patient. My supervising physician was available for consultation. CHIEF COMPLAINT       Chief Complaint   Patient presents with    Emesis During Pregnancy     Pt pregnant with her first child. Reports she's 14 weeks. Started vomiting on  evening and hasn't been able to hold any food down since. Denies any abdominal pain but reports she does have a headache. No spotting or bleeding. HISTORY OF PRESENT ILLNESS: 1 or more Elements     History From: Patient    Limitations to history : None    Reji Arteaga is a 24 y.o. female who presents to the emergency department with complaint of nausea and vomiting of pregnancy and mild dysuria. States has had UTIs during this pregnancy. Last UTI late December with E. coli and pansensitive. She reports nausea and vomiting most likely related to pregnancy though could be related to UTI. She has taken Zofran, but not helping as much at this time. She did have a mild headache took some Fioricet about noon had emesis 30 minutes later. She comes in for evaluation of nausea and vomiting of pregnancy and possible UTI. She does report some suprapubic discomforts. At this time she shows heart rate 79, temp 97.9, respiratory 19 and oximetry room air 99%. Not suspecting pyelonephritis in the absence of flank pain and not anticipating sepsis with normal vital signs. This patient is followed by Dr. Kip Austin and [facility] physicians. She does have an identified IUP by ultrasound. She also has a pre-existing cyst on the right. The patient is  Ab0. This is her first pregnancy.     Nursing Notes were all reviewed and agreed with or any disagreements were addressed in the HPI. REVIEW OF SYSTEMS :      Review of Systems    Positives and Pertinent negatives as per HPI. SURGICAL HISTORY     Past Surgical History:   Procedure Laterality Date    PLANTAR FASCIA SURGERY Left 4/28/2021    ENDOSCOPIC PLANTAR FASCIOTOMY LEFT FOOT performed by Roni Rico DPM at [facility] Right 05/12/2021    PLANTAR FASCIA SURGERY Right 5/12/2021    ENDOSCOPIC PLANTAR FASCIOTOMY RIGHT FOOT performed by Roni Rico DPM at [address] [place] [address]       Discharge Medication List as of 1/24/2023  5:44 PM        CONTINUE these medications which have NOT CHANGED    Details   butalbital-acetaminophen-caffeine (FIORICET, ESGIC) -40 MG per tablet Take 1-2 tablets by mouth every 6 hours as neededHistorical Med      metoclopramide (REGLAN) 10 MG tablet Historical Med      ondansetron (ZOFRAN) 4 MG tablet Historical Med      FLUoxetine (PROZAC) 20 MG capsule TAKE 1 CAPSULE BY MOUTH ONE TIME A DAY, Disp-30 capsule, R-0Normal      hydrOXYzine HCl (ATARAX) 50 MG tablet Take 1 tablet by mouth 3 times daily as needed for Anxiety, Disp-270 tablet, R-0Normal      SUMAtriptan (IMITREX) 100 MG tablet Take one tablet for a severe headache/migraine. May repeat one tablet in 2 hours if needed one time. Do not use more than 2 tablets in 24 hours.  Do not use more than 8 tablets in one month., Disp-9 tablet, R-2Normal             ALLERGIES     Percocet [oxycodone-acetaminophen] and Amoxicillin-pot clavulanate    FAMILYHISTORY       Family History   Problem Relation Age of Onset    High Blood Pressure Mother     High Blood Pressure Father     Allergy (Severe) Brother     Heart Disease Maternal Grandmother     Heart Disease Maternal Grandfather     Heart Disease Paternal Grandmother     Heart Disease Paternal Grandfather     Diabetes Maternal Cousin         SOCIAL HISTORY       Social History     Tobacco Use Smoking status: Never    Smokeless tobacco: Never   Substance Use Topics    Alcohol use: No    Drug use: No       SCREENINGS        [place] Coma Scale  Eye Opening: Spontaneous  Best Verbal Response: Oriented  Best Motor Response: Obeys commands  [place] Coma Scale Score: 15                CIWA Assessment  BP: 109/60  Heart Rate: 69           PHYSICAL EXAM  1 or more Elements     ED Triage Vitals [01/24/23 1457]   BP Temp Temp src Heart Rate Resp SpO2 Height Weight   115/73 97.9 °F (36.6 °C) -- 79 19 99 % 5' 3\" (1.6 m) 173 lb (78.5 kg)       Physical Exam  Vitals and nursing note reviewed. Constitutional:       Appearance: Normal appearance. She is well-developed and normal weight. HENT:      Head: Normocephalic and atraumatic. Right Ear: External ear normal.      Left Ear: External ear normal.   Eyes:      General: No scleral icterus. Right eye: No discharge. Left eye: No discharge. Conjunctiva/sclera: Conjunctivae normal.   Cardiovascular:      Rate and Rhythm: Normal rate and regular rhythm. Heart sounds: Normal heart sounds. Pulmonary:      Effort: Pulmonary effort is normal.      Breath sounds: Normal breath sounds. Abdominal:      General: Abdomen is flat. Bowel sounds are normal.      Palpations: Abdomen is soft. Tenderness: There is abdominal tenderness. Comments: The patient with mild tenderness suprapubic as well as right and left lower quadrants. Musculoskeletal:         General: Normal range of motion. Cervical back: Normal range of motion and neck supple. Right lower leg: No edema. Left lower leg: No edema. Skin:     General: Skin is warm and dry. Neurological:      General: No focal deficit present. Mental Status: She is alert and oriented to person, place, and time. Mental status is at baseline. Psychiatric:         Mood and Affect: Mood normal.         Behavior: Behavior normal.         Thought Content:  Thought content normal.         Judgment: Judgment normal.       DIAGNOSTIC RESULTS   LABS:    Labs Reviewed   BASIC METABOLIC PANEL - Abnormal; Notable for the following components:       Result Value    Sodium 134 (*)     Creatinine <0.5 (*)     All other components within normal limits   HEPATIC FUNCTION PANEL - Abnormal; Notable for the following components:    AST 14 (*)     All other components within normal limits   URINALYSIS WITH REFLEX TO CULTURE - Abnormal; Notable for the following components:    Color, UA DARK YELLOW (*)     Clarity, UA CLOUDY (*)     Ketones, Urine TRACE (*)     Leukocyte Esterase, Urine MODERATE (*)     All other components within normal limits   MICROSCOPIC URINALYSIS - Abnormal; Notable for the following components:    WBC, UA 10-20 (*)     Epithelial Cells, UA 21-50 (*)     Bacteria, UA 3+ (*)     All other components within normal limits   CULTURE, URINE   CBC WITH AUTO DIFFERENTIAL   LIPASE   MAGNESIUM       When ordered only abnormal lab results are displayed. All other labs were within normal range or not returned as of this dictation. EKG: When ordered, EKG's are interpreted by the Emergency Department Physician in the absence of a cardiologist.  Please see their note for interpretation of EKG. RADIOLOGY:   Non-plain film images such as CT, Ultrasound and MRI are read by the radiologist. Plain radiographic images are visualized and preliminarily interpreted by the ED Provider with the below findings:      Interpretation per the Radiologist below, if available at the time of this note:    No orders to display     No results found. No results found.     PROCEDURES   Unless otherwise noted below, none     Procedures    CRITICAL CARE TIME (.cctime)       PAST MEDICAL HISTORY      has a past medical history of Anxiety, Arnold-Chiari malformation, type I ([facility] [address] (12/11/2017), Arnold-Chiari malformation, type II ([facility] [address] (12/11/2017), Chiari I malformation ([facility] [address] Concussion (11/2013), Depression, Migraine without aura and without status migrainosus, not intractable (12/11/2017), and PONV (postoperative nausea and vomiting). EMERGENCY DEPARTMENT COURSE and DIFFERENTIAL DIAGNOSIS/MDM:   Vitals:    Vitals:    01/24/23 1457 01/24/23 1633 01/24/23 1742   BP: 115/73 100/63 109/60   Pulse: 79 66 69   Resp: 19 16 14   Temp: 97.9 °F (36.6 °C)     SpO2: 99% 98% 99%   Weight: 173 lb (78.5 kg)     Height: 5' 3\" (1.6 m)         Patient was given the following medications:  Medications   0.9 % sodium chloride bolus (0 mLs IntraVENous Stopped 1/24/23 1632)   metoclopramide (REGLAN) injection 10 mg (10 mg IntraVENous Given 1/24/23 1521)   diphenhydrAMINE (BENADRYL) injection 12.5 mg (12.5 mg IntraVENous Given 1/24/23 1521)   cefTRIAXone (ROCEPHIN) 1,000 mg in dextrose 5 % 50 mL IVPB mini-bag (0 mg IntraVENous Stopped 1/24/23 1705)             Is this patient to be included in the SEP-1 Core Measure due to severe sepsis or septic shock? No   Exclusion criteria - the patient is NOT to be included for SEP-1 Core Measure due to: Infection is not suspected    Chronic Conditions affecting care: Pregnancy   has a past medical history of Anxiety, Arnold-Chiari malformation, type I ([facility] [address] (12/11/2017), Arnold-Chiari malformation, type II ([facility] [address] (12/11/2017), Chiari I malformation ([facility] [address] Concussion (11/2013), Depression, Migraine without aura and without status migrainosus, not intractable (12/11/2017), and PONV (postoperative nausea and vomiting). CONSULTS: (Who and What was discussed)  None      Social Determinants : None    Records Reviewed (Source): [facility] laboratory record showing E. coli infection of urine late December, 2022. CC/HPI Summary, DDx, ED Course, and Reassessment: Patient presenting with nausea and vomiting of pregnancy and with cloudy urine and with mild urinary symptoms. Patient with UTI late December with E. coli grown on culture and pansensitive to antibiotics.   Urine today is positive showing cloudy character, moderate leukocyte and WBC at 10-20. She is not septic with normal vital signs and no evidence flank pain to suggest pyelonephritis. I did discuss with pharmacy recommending cephalosporins. Rocephin 1 g IVPB given and will discharge patient with Omnicef 300 mg twice daily x10 days this sent to the [facility] outpatient pharmacy. The patient is aware to use Zofran for nausea as prescribed by OB. The patient also recommended to utilize vitamin B6 and doxylamine. Patient recommended to maintain good hydration and take the antibiotics as prescribed. She will follow with her OB physician on Friday for recheck. She is aware to return should symptoms worsen. Disposition Considerations (tests considered but not done, Admit vs D/C, Shared Decision Making, Pt Expectation of Test or Tx.): Patient safe to be discharged home with diagnosis nausea and vomiting of pregnancy and UTI associated with pregnancy. Rocephin given. Omnicef prescribed twice daily x10 days. She will utilize Zofran for nausea. Nausea and vomiting in the emergency room was controlled adequately with Benadryl 12.5 mg IV x1 and Reglan 10 mg IV x1. Hydrated NaCl 1 L. Patient agreeable and will discharge to home. I did recommend to the patient to utilize OTC vitamin B6 as well as doxylamine twice daily. She will also run this by her OB physician. Have asked her to follow-up with her OB physician on Friday for recheck regarding urinary symptoms and review the culture results. Medication sent to the [facility] outpatient pharmacy. Patient expresses her understanding of the diagnosis and the treatment plan. I am the Primary Clinician of Record. FINAL IMPRESSION      1. Urinary tract infection in mother during second trimester of pregnancy    2. Pregnancy with 14 completed weeks gestation    3.  Nausea and vomiting during pregnancy          DISPOSITION/PLAN     DISPOSITION Decision To Discharge 01/24/2023 06:02:43 PM      PATIENT REFERRED TO:  Your OB physician    Schedule an appointment as soon as possible for a visit in 3 days      Galen Delarosa MD  [address], [address] [facility] [phone]    Schedule an appointment as soon as possible for a visit   As needed    [facility]  ED  [address]  Go to   If symptoms worsen      DISCHARGE MEDICATIONS:  Discharge Medication List as of 1/24/2023  5:44 PM        START taking these medications    Details   cefdinir (OMNICEF) 300 MG capsule Take 1 capsule by mouth 2 times daily for 10 days, Disp-20 capsule, R-0Normal             DISCONTINUED MEDICATIONS:  Discharge Medication List as of 1/24/2023  5:44 PM                 (Please note that portions of this note were completed with a voice recognition program.  Efforts were made to edit the dictations but occasionally words are mis-transcribed. )    Ayde Ohara PA-C (electronically signed)       Ayde Ohara PA-C  01/24/23 Xochilt Huynh

## 2023-01-24 NOTE — DISCHARGE INSTRUCTIONS
I do understand recent urinary tract infection late December. E. coli species noted. Culture pending at this time. Urine is positive. I did give you Rocephin 1 g IVPB. I will continue with third-generation cephalosporin Omnicef 300 mg take this twice daily for the next 10 days. Increase your fluid intake. We discussed causes for urinary tract infection. With regard to nausea and vomiting of pregnancy I did give you NaCl 1 L, Benadryl 12.5 mg IV x1 and Reglan 10 mg IV x1. You felt improved and able to go home. At home usually Zofran. Also recommend vitamin D B6 25 mg twice daily and doxylamine 25 mg twice daily.

## 2023-01-25 LAB — URINE CULTURE, ROUTINE: NORMAL

## 2023-02-02 DIAGNOSIS — Z00.00 ENCOUNTER FOR WELL ADULT EXAM WITHOUT ABNORMAL FINDINGS: ICD-10-CM

## 2023-02-02 DIAGNOSIS — F41.9 ANXIETY: ICD-10-CM

## 2023-02-02 RX ORDER — FLUOXETINE HYDROCHLORIDE 20 MG/1
CAPSULE ORAL
Qty: 90 CAPSULE | Refills: 0 | Status: SHIPPED | OUTPATIENT
Start: 2023-02-02

## 2023-02-02 NOTE — TELEPHONE ENCOUNTER
----- Message from Cheryl Spain MD sent at 2/2/2023 11:18 AM EST -----  Contact: Valery Ordonez [phone]  [address]   ----- Message -----  From: Tomas Cote  Sent: 2/2/2023  10:51 AM EST  To: Cheryl Spain MD    Patient requesting refill   Appointment scheduled 4/3    FLUoxetine (PROZAC) 20 MG capsule     [address] [phone] [facility] [phone]   [address] [place] [address]   Phone:  [phone]  Fax:  [phone]

## 2023-08-24 NOTE — TELEPHONE ENCOUNTER
----- Message from Deangelo Smart sent at 3/30/2022  1:29 PM EDT -----  Contact: [phone] (M)  The fax number for the pt's work is [phone].     Thank you Insurance is asking for a reason why pt can't try metformin

## (undated) DEVICE — SUTURE ETHLN SZ 4-0 L18IN NONABSORBABLE BLK L19MM PS-2 3/8 1667H

## (undated) DEVICE — NEEDLE HYPO 22GA L1.5IN BLK POLYPR HUB S STL REG BVL STR

## (undated) DEVICE — SOLUTION,SALINE,IRRGATION,500ML,STRL: Brand: MEDLINE

## (undated) DEVICE — HOOK BLADE: Brand: ENDOTRAC

## (undated) DEVICE — COTTON UNDERCAST PADDING,CRIMPED FINISH: Brand: WEBRIL

## (undated) DEVICE — GLOVE SURG SZ 7 L12IN FNGR THK94MIL STD WHT ISOLEX LTX FREE

## (undated) DEVICE — BANDAGE COMPR W3INXL5YD TAN BRTH SELF ADH WRP W/ HND TEAR

## (undated) DEVICE — GLOVE SURG SZ 65 L12IN FNGR THK94MIL STD WHT ISOLEX LTX

## (undated) DEVICE — PACK EXTREMITY XR

## (undated) DEVICE — SYRINGE MED 10ML LUERLOCK TIP W/O SFTY DISP

## (undated) DEVICE — NEEDLE HYPO 18GA L1.5IN PNK POLYPR HUB S STL THN WALL FILL